# Patient Record
Sex: MALE | Race: WHITE | NOT HISPANIC OR LATINO | Employment: FULL TIME | URBAN - METROPOLITAN AREA
[De-identification: names, ages, dates, MRNs, and addresses within clinical notes are randomized per-mention and may not be internally consistent; named-entity substitution may affect disease eponyms.]

---

## 2017-03-01 ENCOUNTER — HOSPITAL ENCOUNTER (OUTPATIENT)
Dept: RADIOLOGY | Facility: CLINIC | Age: 32
Discharge: HOME | End: 2017-03-01
Payer: COMMERCIAL

## 2017-03-01 ENCOUNTER — ALLSCRIPTS OFFICE VISIT (OUTPATIENT)
Dept: OTHER | Facility: OTHER | Age: 32
End: 2017-03-01

## 2017-03-01 DIAGNOSIS — M25.521 PAIN IN RIGHT ELBOW: ICD-10-CM

## 2017-03-01 DIAGNOSIS — T15.90XA FOREIGN BODY OF EXTERNAL EYE: ICD-10-CM

## 2017-03-01 PROCEDURE — 73080 X-RAY EXAM OF ELBOW: CPT

## 2017-03-02 ENCOUNTER — TRANSCRIBE ORDERS (OUTPATIENT)
Dept: ADMINISTRATIVE | Facility: HOSPITAL | Age: 32
End: 2017-03-02

## 2017-03-02 ENCOUNTER — HOSPITAL ENCOUNTER (OUTPATIENT)
Dept: RADIOLOGY | Facility: HOSPITAL | Age: 32
Discharge: HOME | End: 2017-03-02
Attending: ORTHOPAEDIC SURGERY
Payer: COMMERCIAL

## 2017-03-02 DIAGNOSIS — T15.90XA FOREIGN BODY OF EXTERNAL EYE: ICD-10-CM

## 2017-03-02 PROCEDURE — 70030 X-RAY EYE FOR FOREIGN BODY: CPT

## 2017-03-07 ENCOUNTER — HOSPITAL ENCOUNTER (OUTPATIENT)
Dept: RADIOLOGY | Facility: HOSPITAL | Age: 32
Discharge: HOME | End: 2017-03-07
Attending: ORTHOPAEDIC SURGERY
Payer: COMMERCIAL

## 2017-03-07 DIAGNOSIS — M25.521 PAIN IN RIGHT ELBOW: ICD-10-CM

## 2017-03-07 PROCEDURE — 73221 MRI JOINT UPR EXTREM W/O DYE: CPT

## 2017-03-10 ENCOUNTER — ALLSCRIPTS OFFICE VISIT (OUTPATIENT)
Dept: OTHER | Facility: OTHER | Age: 32
End: 2017-03-10

## 2017-04-26 RX ORDER — TRAMADOL HYDROCHLORIDE 50 MG/1
50 TABLET ORAL EVERY 6 HOURS PRN
COMMUNITY
End: 2019-12-06

## 2017-04-30 ENCOUNTER — ANESTHESIA EVENT (OUTPATIENT)
Dept: PERIOP | Facility: AMBULARY SURGERY CENTER | Age: 32
End: 2017-04-30
Payer: COMMERCIAL

## 2017-05-01 ENCOUNTER — ANESTHESIA (OUTPATIENT)
Dept: PERIOP | Facility: AMBULARY SURGERY CENTER | Age: 32
End: 2017-05-01
Payer: COMMERCIAL

## 2017-05-01 ENCOUNTER — HOSPITAL ENCOUNTER (OUTPATIENT)
Facility: AMBULARY SURGERY CENTER | Age: 32
Setting detail: OUTPATIENT SURGERY
Discharge: HOME | End: 2017-05-01
Attending: ORTHOPAEDIC SURGERY | Admitting: ORTHOPAEDIC SURGERY
Payer: COMMERCIAL

## 2017-05-01 VITALS
SYSTOLIC BLOOD PRESSURE: 117 MMHG | HEART RATE: 88 BPM | TEMPERATURE: 97 F | HEIGHT: 68 IN | WEIGHT: 245 LBS | RESPIRATION RATE: 20 BRPM | DIASTOLIC BLOOD PRESSURE: 71 MMHG | BODY MASS INDEX: 37.13 KG/M2 | OXYGEN SATURATION: 95 %

## 2017-05-01 PROCEDURE — C1713 ANCHOR/SCREW BN/BN,TIS/BN: HCPCS | Performed by: ORTHOPAEDIC SURGERY

## 2017-05-01 DEVICE — ANCHOR SUT MINI 2.4 X 8.5MM DISP: Type: IMPLANTABLE DEVICE | Site: ARM | Status: FUNCTIONAL

## 2017-05-01 RX ORDER — SODIUM CHLORIDE, SODIUM LACTATE, POTASSIUM CHLORIDE, CALCIUM CHLORIDE 600; 310; 30; 20 MG/100ML; MG/100ML; MG/100ML; MG/100ML
125 INJECTION, SOLUTION INTRAVENOUS CONTINUOUS
Status: DISCONTINUED | OUTPATIENT
Start: 2017-05-01 | End: 2017-05-01 | Stop reason: HOSPADM

## 2017-05-01 RX ORDER — FENTANYL CITRATE/PF 50 MCG/ML
50 SYRINGE (ML) INJECTION
Status: DISCONTINUED | OUTPATIENT
Start: 2017-05-01 | End: 2017-05-01 | Stop reason: HOSPADM

## 2017-05-01 RX ORDER — KETOROLAC TROMETHAMINE 30 MG/ML
INJECTION, SOLUTION INTRAMUSCULAR; INTRAVENOUS AS NEEDED
Status: DISCONTINUED | OUTPATIENT
Start: 2017-05-01 | End: 2017-05-01 | Stop reason: SURG

## 2017-05-01 RX ORDER — FENTANYL CITRATE 50 UG/ML
INJECTION, SOLUTION INTRAMUSCULAR; INTRAVENOUS AS NEEDED
Status: DISCONTINUED | OUTPATIENT
Start: 2017-05-01 | End: 2017-05-01 | Stop reason: SURG

## 2017-05-01 RX ORDER — ONDANSETRON 2 MG/ML
INJECTION INTRAMUSCULAR; INTRAVENOUS AS NEEDED
Status: DISCONTINUED | OUTPATIENT
Start: 2017-05-01 | End: 2017-05-01 | Stop reason: SURG

## 2017-05-01 RX ORDER — MIDAZOLAM HYDROCHLORIDE 1 MG/ML
INJECTION INTRAMUSCULAR; INTRAVENOUS AS NEEDED
Status: DISCONTINUED | OUTPATIENT
Start: 2017-05-01 | End: 2017-05-01 | Stop reason: SURG

## 2017-05-01 RX ORDER — PROPOFOL 10 MG/ML
INJECTION, EMULSION INTRAVENOUS AS NEEDED
Status: DISCONTINUED | OUTPATIENT
Start: 2017-05-01 | End: 2017-05-01 | Stop reason: SURG

## 2017-05-01 RX ORDER — BUPIVACAINE HYDROCHLORIDE AND EPINEPHRINE 5; 5 MG/ML; UG/ML
INJECTION, SOLUTION EPIDURAL; INTRACAUDAL; PERINEURAL AS NEEDED
Status: DISCONTINUED | OUTPATIENT
Start: 2017-05-01 | End: 2017-05-01 | Stop reason: HOSPADM

## 2017-05-01 RX ADMIN — DEXAMETHASONE SODIUM PHOSPHATE 8 MG: 10 INJECTION INTRAMUSCULAR; INTRAVENOUS at 13:00

## 2017-05-01 RX ADMIN — FENTANYL CITRATE 50 MCG: 50 INJECTION, SOLUTION INTRAMUSCULAR; INTRAVENOUS at 13:01

## 2017-05-01 RX ADMIN — ONDANSETRON 4 MG: 2 INJECTION INTRAMUSCULAR; INTRAVENOUS at 13:29

## 2017-05-01 RX ADMIN — MIDAZOLAM HYDROCHLORIDE 2 MG: 1 INJECTION, SOLUTION INTRAMUSCULAR; INTRAVENOUS at 12:54

## 2017-05-01 RX ADMIN — SODIUM CHLORIDE, SODIUM LACTATE, POTASSIUM CHLORIDE, AND CALCIUM CHLORIDE 125 ML/HR: .6; .31; .03; .02 INJECTION, SOLUTION INTRAVENOUS at 11:52

## 2017-05-01 RX ADMIN — FENTANYL CITRATE 50 MCG: 50 INJECTION, SOLUTION INTRAMUSCULAR; INTRAVENOUS at 13:30

## 2017-05-01 RX ADMIN — CEFAZOLIN SODIUM 2000 MG: 1 SOLUTION INTRAVENOUS at 13:02

## 2017-05-01 RX ADMIN — PROPOFOL 200 MG: 10 INJECTION, EMULSION INTRAVENOUS at 12:55

## 2017-05-01 RX ADMIN — KETOROLAC TROMETHAMINE 30 MG: 30 INJECTION, SOLUTION INTRAMUSCULAR at 13:29

## 2017-05-10 ENCOUNTER — ALLSCRIPTS OFFICE VISIT (OUTPATIENT)
Dept: OTHER | Facility: OTHER | Age: 32
End: 2017-05-10

## 2017-05-17 ENCOUNTER — APPOINTMENT (OUTPATIENT)
Dept: OCCUPATIONAL THERAPY | Facility: CLINIC | Age: 32
End: 2017-05-17
Payer: COMMERCIAL

## 2017-05-17 PROCEDURE — 97165 OT EVAL LOW COMPLEX 30 MIN: CPT

## 2017-05-22 ENCOUNTER — APPOINTMENT (OUTPATIENT)
Dept: OCCUPATIONAL THERAPY | Facility: CLINIC | Age: 32
End: 2017-05-22
Payer: COMMERCIAL

## 2017-05-22 PROCEDURE — 97140 MANUAL THERAPY 1/> REGIONS: CPT

## 2017-05-22 PROCEDURE — 97110 THERAPEUTIC EXERCISES: CPT

## 2017-05-23 ENCOUNTER — APPOINTMENT (OUTPATIENT)
Dept: OCCUPATIONAL THERAPY | Facility: CLINIC | Age: 32
End: 2017-05-23
Payer: COMMERCIAL

## 2017-05-24 ENCOUNTER — APPOINTMENT (OUTPATIENT)
Dept: OCCUPATIONAL THERAPY | Facility: CLINIC | Age: 32
End: 2017-05-24
Payer: COMMERCIAL

## 2017-05-24 PROCEDURE — 97110 THERAPEUTIC EXERCISES: CPT

## 2017-05-24 PROCEDURE — 97140 MANUAL THERAPY 1/> REGIONS: CPT

## 2017-05-31 ENCOUNTER — APPOINTMENT (OUTPATIENT)
Dept: OCCUPATIONAL THERAPY | Facility: CLINIC | Age: 32
End: 2017-05-31
Payer: COMMERCIAL

## 2017-05-31 PROCEDURE — 97140 MANUAL THERAPY 1/> REGIONS: CPT

## 2017-05-31 PROCEDURE — 97110 THERAPEUTIC EXERCISES: CPT

## 2017-06-05 ENCOUNTER — APPOINTMENT (OUTPATIENT)
Dept: OCCUPATIONAL THERAPY | Facility: CLINIC | Age: 32
End: 2017-06-05
Payer: COMMERCIAL

## 2017-06-05 PROCEDURE — 97110 THERAPEUTIC EXERCISES: CPT

## 2017-06-05 PROCEDURE — 97140 MANUAL THERAPY 1/> REGIONS: CPT

## 2017-06-07 ENCOUNTER — APPOINTMENT (OUTPATIENT)
Dept: OCCUPATIONAL THERAPY | Facility: CLINIC | Age: 32
End: 2017-06-07
Payer: COMMERCIAL

## 2017-06-07 PROCEDURE — 97140 MANUAL THERAPY 1/> REGIONS: CPT

## 2017-06-07 PROCEDURE — 97110 THERAPEUTIC EXERCISES: CPT

## 2017-06-12 ENCOUNTER — APPOINTMENT (OUTPATIENT)
Dept: OCCUPATIONAL THERAPY | Facility: CLINIC | Age: 32
End: 2017-06-12
Payer: COMMERCIAL

## 2017-06-12 PROCEDURE — 97110 THERAPEUTIC EXERCISES: CPT

## 2017-06-12 PROCEDURE — 97140 MANUAL THERAPY 1/> REGIONS: CPT

## 2017-06-14 ENCOUNTER — APPOINTMENT (OUTPATIENT)
Dept: OCCUPATIONAL THERAPY | Facility: CLINIC | Age: 32
End: 2017-06-14
Payer: COMMERCIAL

## 2017-06-14 PROCEDURE — 97110 THERAPEUTIC EXERCISES: CPT

## 2017-06-14 PROCEDURE — 97140 MANUAL THERAPY 1/> REGIONS: CPT

## 2017-06-19 ENCOUNTER — APPOINTMENT (OUTPATIENT)
Dept: OCCUPATIONAL THERAPY | Facility: CLINIC | Age: 32
End: 2017-06-19
Payer: COMMERCIAL

## 2017-06-21 ENCOUNTER — APPOINTMENT (OUTPATIENT)
Dept: OCCUPATIONAL THERAPY | Facility: CLINIC | Age: 32
End: 2017-06-21
Payer: COMMERCIAL

## 2017-06-21 ENCOUNTER — ALLSCRIPTS OFFICE VISIT (OUTPATIENT)
Dept: OTHER | Facility: OTHER | Age: 32
End: 2017-06-21

## 2017-06-21 PROCEDURE — 97140 MANUAL THERAPY 1/> REGIONS: CPT

## 2017-06-21 PROCEDURE — 97110 THERAPEUTIC EXERCISES: CPT

## 2017-06-26 ENCOUNTER — APPOINTMENT (OUTPATIENT)
Dept: OCCUPATIONAL THERAPY | Facility: CLINIC | Age: 32
End: 2017-06-26
Payer: COMMERCIAL

## 2017-06-26 PROCEDURE — 97140 MANUAL THERAPY 1/> REGIONS: CPT

## 2017-06-26 PROCEDURE — 97110 THERAPEUTIC EXERCISES: CPT

## 2017-06-28 ENCOUNTER — APPOINTMENT (OUTPATIENT)
Dept: OCCUPATIONAL THERAPY | Facility: CLINIC | Age: 32
End: 2017-06-28
Payer: COMMERCIAL

## 2017-06-28 PROCEDURE — 97110 THERAPEUTIC EXERCISES: CPT

## 2017-06-28 PROCEDURE — 97140 MANUAL THERAPY 1/> REGIONS: CPT

## 2017-07-05 ENCOUNTER — APPOINTMENT (OUTPATIENT)
Dept: OCCUPATIONAL THERAPY | Facility: CLINIC | Age: 32
End: 2017-07-05
Payer: COMMERCIAL

## 2017-07-05 PROCEDURE — 97140 MANUAL THERAPY 1/> REGIONS: CPT

## 2017-07-05 PROCEDURE — 97110 THERAPEUTIC EXERCISES: CPT

## 2017-07-10 ENCOUNTER — APPOINTMENT (OUTPATIENT)
Dept: OCCUPATIONAL THERAPY | Facility: CLINIC | Age: 32
End: 2017-07-10
Payer: COMMERCIAL

## 2017-07-10 PROCEDURE — 97110 THERAPEUTIC EXERCISES: CPT

## 2017-07-10 PROCEDURE — 97140 MANUAL THERAPY 1/> REGIONS: CPT

## 2017-07-12 ENCOUNTER — APPOINTMENT (OUTPATIENT)
Dept: OCCUPATIONAL THERAPY | Facility: CLINIC | Age: 32
End: 2017-07-12
Payer: COMMERCIAL

## 2017-07-12 PROCEDURE — 97110 THERAPEUTIC EXERCISES: CPT

## 2017-07-12 PROCEDURE — 97140 MANUAL THERAPY 1/> REGIONS: CPT

## 2017-07-17 ENCOUNTER — APPOINTMENT (OUTPATIENT)
Dept: OCCUPATIONAL THERAPY | Facility: CLINIC | Age: 32
End: 2017-07-17
Payer: COMMERCIAL

## 2017-07-17 PROCEDURE — 97110 THERAPEUTIC EXERCISES: CPT

## 2017-07-24 ENCOUNTER — APPOINTMENT (OUTPATIENT)
Dept: OCCUPATIONAL THERAPY | Facility: CLINIC | Age: 32
End: 2017-07-24
Payer: COMMERCIAL

## 2017-07-26 ENCOUNTER — APPOINTMENT (OUTPATIENT)
Dept: OCCUPATIONAL THERAPY | Facility: CLINIC | Age: 32
End: 2017-07-26
Payer: COMMERCIAL

## 2017-08-02 ENCOUNTER — ALLSCRIPTS OFFICE VISIT (OUTPATIENT)
Dept: OTHER | Facility: OTHER | Age: 32
End: 2017-08-02

## 2018-01-12 NOTE — PROGRESS NOTES
Assessment    1  Traumatic rupture of tendon of elbow or forearm (841 9) (S52 583U)    Plan  Nery Stone does have a partial Tear at the common extensor tendon in his right elbow  Conservative treatment has failed and his elbow remains quite painful  We did discuss that there is a surgical option for this to repair the tendon  The pain will cause him to miss work occasionally  Nery Stone is frustrated with his painful right elbow and would like to have surgery to repair this  We had a long discussion including the risks of the surgery which are but not inclusive to infection or failure of the procedure  We also discussed there is a 3 month recovery time before he can return to full duty at work as a   We also discussed that this surgery is quite painful and he is intolerable to light touch on his elbow today  I do have some concern over his tolerance to pain and he needs to carefully consider this before proceeding with surgery  He would like to take some time and 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  He will meet with her  today and discuss dates 2-3 months in advance  Discussion/Summary  The patient was counseled regarding diagnostic results, instructions for management, risk factor reductions, prognosis, patient and family education, impressions, risks and benefits of treatment options, importance of compliance with treatment  Chief Complaint    1  Elbow Pain    History of Present Illness  Nery Stone is a 77-year-old male here today on a follow-up visit for right elbow pain  On last visit I prescribed an MRI questioned extensor tendon tear as he has been suffering from lateral epicondylitis for over a year and conservative measures of treatment have failed him  He continues to have the persistent ache about the right lateral elbow that will radiate distally  Extension of his elbow will dramatically increases pain   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  He will find relief if he flexes the elbow and rest on his midsection  Review of Systems    Constitutional: No fever or chills, feels well, no tiredness, no recent weight loss or weight gain  Eyes: No complaints of red eyes, no eyesight problems  ENT: no complaints of loss of hearing, no nosebleeds, no sore throat  Cardiovascular: No complaints of chest pain, no palpitations, no leg claudication or lower extremity edema  Respiratory: No complaints of shortness of breath, no wheezing, no cough  Gastrointestinal: No complaints of abdominal pain, no constipation, no nausea or vomiting, no diarrhea or bloody stools  Genitourinary: No complaints of dysuria or incontinence, no hesitancy, no nocturia  Musculoskeletal: as noted in HPI  Integumentary: No complaints of skin rash or lesion, no itching or dry skin, no skin wounds  Neurological: No complaints of headache, no confusion, no numbness or tingling, no dizziness  Psychiatric: No suicidal thoughts, no anxiety, no depression  Endocrine: No muscle weakness, no frequent urination, no excessive thirst, no feelings of weakness  ROS reviewed  Active Problems    1  Foreign body in eye, unspecified laterality, initial encounter ([id number]) (T15 [id number])   2  Lateral epicondylitis of right elbow (726 32) (M77 11)   3  Right elbow pain (719 42) (M25 521)    Past Medical History    The active problems and past medical history were reviewed and updated today  Surgical History    · History of Nose Surgery    The surgical history was reviewed and updated today  Family History    · Family history of arthritis (V17 7) (Z82 61)   · Family history of malignant neoplasm (V16 9) (Z80 9)    The family history was reviewed and updated today  Social History    · Never a smoker   · Occasional alcohol use  The social history was reviewed and updated today  The social history was reviewed and is unchanged  Current Meds   1  TraMADol HCl - 50 MG Oral Tablet; Therapy: (Recorded:01Mar2017) to Recorded    The medication list was reviewed and updated today  Allergies    1  No Known Drug Allergies    Physical Exam    Right Elbow: Appearance: Normal except swelling, but no deformity, no ecchymosis, no effusion and no erythema  Tenderness: None except the lateral epicondyle, but not the medial epicondyle and not the olecranon bursa  Palpatory Findings: no warmth  Extension: painful  Pronation: 5/5  Supination: 3/5 and painful  Motor: Normal except as noted:   Special Tests: negative valgus stress and negative varus stress  Cardiovascular - Heart - RRR, no murmurs, no rubs, no gallops  Respiratory - Lungs - Clear to auscultation bilaterally, no rales, no rhonci, no wheezes  Results/Data  I personally reviewed the films/images/results in the office today  My interpretation follows  MRI Review MRI of the right elbow demonstrates a partial intrasubstance tear at the origin of the common extensor tendon  Attending Note  Collaborating Physician Note: Collaborating Physician: I interviewed and examined the patient, I discussed the case with the Advanced Practitioner and reviewed the note, I supervised the Advanced Practitioner and I agree with the Advanced Practitioner note  Agree with Advanced Practitioner Note Except: Nery Stone continues to have chronic pain about the right elbow and wishes to have a right elbow extensor tendon repair at the lateral epicondyle  He understands the risks and benefits of procedure and wishes to go ahead  He has pain to palpation over the lateral epicondyle as well as decreased strength in testing of the wrist extensors  The risks are inclusive of but not limited to infection, stiffness, nerve injury causing numbness pain and weakness, persistence of symptoms, recurrence of tear, failure to regain full strength and ability, worsening of symptoms, and need for further surgery  Future Appointments    Date/Time Provider Specialty Site   05/01/2017 01:00 PM ROBB Higgins  Orthopedic Surgery [facility] OUTPAT[facility]   05/10/2017 10:10 AM ROBB Higgins   Orthopedic Surgery [facility]     Signatures   Electronically signed by : TI Purvis; Mar 10 2017  9:14AM EST                       (Author)    Electronically signed by : ROBB Will ; Mar 10 2017  5:45PM EST                       (Author)

## 2018-01-13 VITALS
DIASTOLIC BLOOD PRESSURE: 85 MMHG | HEART RATE: 94 BPM | WEIGHT: 244 LBS | BODY MASS INDEX: 36.98 KG/M2 | SYSTOLIC BLOOD PRESSURE: 121 MMHG | HEIGHT: 68 IN

## 2018-01-13 VITALS
DIASTOLIC BLOOD PRESSURE: 83 MMHG | SYSTOLIC BLOOD PRESSURE: 129 MMHG | HEART RATE: 101 BPM | BODY MASS INDEX: 38.66 KG/M2 | HEIGHT: 68 IN | WEIGHT: 255.13 LBS

## 2019-12-04 NOTE — TELEPHONE ENCOUNTER
Shereen Iyer  called in    # [phone]  Fax# [phone]    She is requesting an appointment for the patient for   Right Groin Pain  [place] w[place]   email sent to [facility]

## 2019-12-06 VITALS
WEIGHT: 261.6 LBS | HEIGHT: 68 IN | BODY MASS INDEX: 39.65 KG/M2 | DIASTOLIC BLOOD PRESSURE: 88 MMHG | HEART RATE: 91 BPM | SYSTOLIC BLOOD PRESSURE: 131 MMHG

## 2019-12-06 DIAGNOSIS — S73.101A HIP SPRAIN, RIGHT, INITIAL ENCOUNTER: Primary | ICD-10-CM

## 2019-12-06 PROCEDURE — 99214 OFFICE O/P EST MOD 30 MIN: CPT | Performed by: ORTHOPAEDIC SURGERY

## 2019-12-06 NOTE — PROGRESS NOTES
Assessment/Plan:  1  Hip sprain, right, initial encounter  MRI hip right wo contrast       Scribe Attestation    I,:   Ofelia Moyer am acting as a scribe while in the presence of the attending physician :        I,:   Marily Godwin MD personally performed the services described in this documentation    as scribed in my presence :            CT scan of his pelvis does demonstrate what appears to be an inguinal hernia, however I do not appreciate any musculoskeletal injury  Upon physical examination, he does demonstrate signs and symptoms consistent with a potential right hip flexor strain versus tear  He does demonstrate decreased strength with flexion as well as pain with palpation over his inguinal region  At this time, I do believe an MRI of his right hip is warranted  I provided him with a prescription for this today in the office  He states he has remained out of work since his injury and I provided a work note stating he is still to remain out of work until further notice  We will see him after he receives his MRI to review the results and discuss further treatment options  Subjective:   Lamar Edmonds is a 37 y o  male who presents to the office today for initial evaluation of right groin pain  This is a worker's compensation case  He states on October 21st he was at work and 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 his leg slipped out to the side and he felt a sharp pain into his groin  He does come with a CT scan that demonstrates a right inguinal hernia  He saw a general surgeon who believes this is not the source of his pain and referred him to us  At today's visit, he localizes the majority of his pain over the anterior aspect of his hip  He describes the pain as sharp, constant and moderate in intensity  He presents with an antalgic gait  He does note an occasional numbness down his leg  He denies any radicular symptoms        Review of Systems   Constitutional: Negative for chills, fever and unexpected weight change  HENT: Negative for hearing loss, nosebleeds and sore throat  Eyes: Negative for pain, redness and visual disturbance  Respiratory: Negative for cough, shortness of breath and wheezing  Cardiovascular: Negative for chest pain, palpitations and leg swelling  Gastrointestinal: Negative for abdominal pain, nausea and vomiting  Endocrine: Negative for polyphagia and polyuria  Genitourinary: Negative for dysuria and hematuria  Musculoskeletal: Positive for myalgias  See HPI   Skin: Negative for rash and wound  Neurological: Positive for numbness  Negative for dizziness and headaches  Psychiatric/Behavioral: Negative for decreased concentration and suicidal ideas  The patient is not nervous/anxious  Past Medical History:   Diagnosis Date    Sleep apnea     Hx 2014 post deviated septum repair       Past Surgical History:   Procedure Laterality Date    EYE SURGERY Left     metal in eye    KNEE BIOPSY      NOSE SURGERY      rhinoplasty, septoplasty    TENDON RELEASE Right 2017    Procedure: RIGHT ELBOW EXTENSOR TENDON REPAIR;  Surgeon: Yolanda Delgado MD;  Location: [facility] MAIN OR;  Service:        Family History   Problem Relation Age of Onset    Hypertension Mother     No Known Problems Father     Asthma Sister     Hypertension Maternal Grandfather        Social History     Occupational History    Not on file   Tobacco Use    Smoking status: Former Smoker     Packs/day: 0 75     Years: 15 00     Pack years: 11 25     Last attempt to quit:      Years since quittin 9    Smokeless tobacco: Never Used   Substance and Sexual Activity    Alcohol use: Yes     Comment: socially    Drug use: No    Sexual activity: Not on file       No current outpatient medications on file      No Known Allergies    Objective:  Vitals:    19 1019   BP: 131/88   Pulse: 91       Right Hip Exam     Tenderness   Right hip tenderness location: inguinal region  Range of Motion   Flexion: 70   External rotation: 30 (sore)   Internal rotation: 5 (pain)     Muscle Strength   Flexion: 3/5     Other   Erythema: absent  Sensation: normal  Pulse: present    Comments:    Log Roll (+)  SLR (+)            Physical Exam   Constitutional: He is oriented to person, place, and time  He appears well-developed and well-nourished  HENT:   Head: Normocephalic and atraumatic  Eyes: Pupils are equal, round, and reactive to light  Conjunctivae are normal    Neck: Normal range of motion  Neck supple  Cardiovascular: Normal rate and intact distal pulses  Pulmonary/Chest: Effort normal  No respiratory distress  Musculoskeletal:   As noted in HPI   Neurological: He is alert and oriented to person, place, and time  Skin: Skin is warm and dry  Psychiatric: He has a normal mood and affect  His behavior is normal    Nursing note and vitals reviewed  I have personally reviewed pertinent films in PACS and my interpretation is as follows:  CT scan of the pelvis without contrast obtained on 11/05/2019 demonstrates what appears to be an inguinal hernia but no obvious musculoskeletal injury

## 2019-12-06 NOTE — TELEPHONE ENCOUNTER
WC  called to have MRI order, Work note, and office notes faxed to [phone]  Office note and work not not complete yet

## 2019-12-06 NOTE — LETTER
December 6, 2019     Patient: Modesto Russell   YOB: 1985   Date of Visit: 12/6/2019       To Whom it May Concern:    Modesto Russell is under my professional care  He was seen in my office on 12/6/2019  He is to remain out of work until further notice  If you have any questions or concerns, please don't hesitate to call           Sincerely,          Catalino Valles MD        CC: No Recipients

## 2019-12-11 DIAGNOSIS — S73.101A HIP SPRAIN, RIGHT, INITIAL ENCOUNTER: Primary | ICD-10-CM

## 2019-12-11 NOTE — TELEPHONE ENCOUNTER
Faxed form  Called and lmom advising order was placed and patient will need xray done first  Please verify patient received message

## 2019-12-16 DIAGNOSIS — S73.101A HIP SPRAIN, RIGHT, INITIAL ENCOUNTER: Primary | ICD-10-CM

## 2019-12-16 NOTE — TELEPHONE ENCOUNTER
----- Message from Leslie Posada PA-C sent at 12/16/2019 11:08 AM EST -----   Normal MRI   PT advised  ----- Message -----  From: Interface, Transcription Incoming  Sent: 12/16/2019  10:31 AM EST  To: Asiya Mcgrath MD

## 2019-12-20 VITALS
WEIGHT: 263 LBS | SYSTOLIC BLOOD PRESSURE: 128 MMHG | HEART RATE: 84 BPM | BODY MASS INDEX: 39.86 KG/M2 | HEIGHT: 68 IN | DIASTOLIC BLOOD PRESSURE: 83 MMHG

## 2019-12-20 DIAGNOSIS — R20.0 NUMBNESS AND TINGLING OF RIGHT LOWER EXTREMITY: ICD-10-CM

## 2019-12-20 DIAGNOSIS — S76.011A STRAIN OF HIP FLEXOR, RIGHT, INITIAL ENCOUNTER: Primary | ICD-10-CM

## 2019-12-20 DIAGNOSIS — R20.2 NUMBNESS AND TINGLING OF RIGHT LOWER EXTREMITY: ICD-10-CM

## 2019-12-20 PROCEDURE — 99214 OFFICE O/P EST MOD 30 MIN: CPT | Performed by: ORTHOPAEDIC SURGERY

## 2019-12-20 NOTE — LETTER
December 20, 2019     Patient: Suzy Covarrubias   YOB: 1985   Date of Visit: 12/20/2019       To Whom it May Concern:    Suzy Covarrubias is under my professional care  He was seen in my office on 12/20/2019  He should not return to work until cleared by a physician  I will re-evaluate him in 4 weeks time  If you have any questions or concerns, please don't hesitate to call           Sincerely,          Shaneka Amato MD        CC: No Recipients

## 2019-12-20 NOTE — PROGRESS NOTES
Assessment/Plan:  1  Strain of hip flexor, right, initial encounter     2  Numbness and tingling of right lower extremity  Ambulatory referral to Pain Management       Scribe Attestation    I,:   Bekah Goldman MA am acting as a scribe while in the presence of the attending physician :        I,:   Everton Richardson MD personally performed the services described in this documentation    as scribed in my presence :            Ania Sher and I engaged in a discussion today in regards to his right hip pain  Patient I reviewed the MRI today in the office which does not demonstrate any tear or other significant injury  I do believe he is suffering from a hip flexor strain  This would be best treated with a course of physical therapy  I would like to see him back in 4 weeks time after he has done physical therapy  At this time we will continue with his work restrictions of no work until further notice  In regards to his numbness and tingling, I believe this is irritation of a nerve from his hip injury  However, to rule out back injury or issue I would like him to follow up with Dr Nunn Neither  The referral for Dr Nunn Neither was placed in the chart today  I will see Chloé Hou back in 4 weeks time  Subjective:   Cloretta Hashimoto is a 37 y o  male who presents to the office today for follow-up evaluation of his right hip  This is a continuation of a worker's compensation case  Patient was able to obtain MRI prior to today's visit  He was advised on the phone that the MRI was normal however due to his persistent pain he presents today in the office  Patient states he has not begun physical therapy  He continues to have pain to the groin area  He also notes some intermittent numbness to the lateral right hip  At this point time he has not returned to work  He denies any new injuries today  Review of Systems   Constitutional: Negative for chills, fever and unexpected weight change     HENT: Negative for hearing loss, nosebleeds and sore throat  Eyes: Negative for pain, redness and visual disturbance  Respiratory: Negative for cough, shortness of breath and wheezing  Cardiovascular: Negative for chest pain, palpitations and leg swelling  Gastrointestinal: Negative for abdominal pain, nausea and vomiting  Endocrine: Negative for polydipsia and polyuria  Genitourinary: Negative for dysuria and hematuria  Musculoskeletal: Positive for arthralgias and myalgias  Negative for joint swelling  Skin: Negative for rash and wound  Neurological: Positive for numbness  Negative for dizziness and headaches  Psychiatric/Behavioral: Negative for agitation, decreased concentration and suicidal ideas  Past Medical History:   Diagnosis Date    Sleep apnea     Hx  post deviated septum repair       Past Surgical History:   Procedure Laterality Date    EYE SURGERY Left     metal in eye    KNEE BIOPSY      NOSE SURGERY      rhinoplasty, septoplasty    TENDON RELEASE Right 2017    Procedure: RIGHT ELBOW EXTENSOR TENDON REPAIR;  Surgeon: Everton Richardson MD;  Location: [facility] MAIN OR;  Service:        Family History   Problem Relation Age of Onset    Hypertension Mother     No Known Problems Father     Asthma Sister     Hypertension Maternal Grandfather        Social History     Occupational History    Not on file   Tobacco Use    Smoking status: Former Smoker     Packs/day: 0 75     Years: 15 00     Pack years: 11 25     Last attempt to quit:      Years since quittin 9    Smokeless tobacco: Never Used   Substance and Sexual Activity    Alcohol use: Yes     Comment: socially    Drug use: No    Sexual activity: Not on file       No current outpatient medications on file  No Known Allergies    Objective:  Vitals:    19 0942   BP: 128/83   Pulse: 84       Right Hip Exam     Tenderness   Right hip tenderness location: Inguinal area      Range of Motion   Flexion: 80   External rotation: 30   Internal rotation: 5     Other   Erythema: absent  Scars: absent  Sensation: normal  Pulse: present    Comments:  Soreness and stiffness noted with range of motion  Hip flexion 4-/5            Physical Exam   Constitutional: He is oriented to person, place, and time  He appears well-developed  HENT:   Head: Normocephalic and atraumatic  Eyes: Conjunctivae are normal    Neck: Neck supple  Cardiovascular: Intact distal pulses  Pulmonary/Chest: Effort normal    Neurological: He is alert and oriented to person, place, and time  Skin: Skin is warm and dry  Psychiatric: He has a normal mood and affect  His behavior is normal        I have personally reviewed pertinent films in PACS and my interpretation is as follows:  MRI of the right hip from outside provider obtained on 12/13/2019 demonstrates no osseous abnormality, no evidence of tear to musculature, and no degeneration of the labrum

## 2019-12-20 NOTE — TELEPHONE ENCOUNTER
Patient sees Dr Chuy GALEAS  from [place] called for Work Status Note, Referral to Pain Management, and Office notes from today to be faxed to [phone]  Office notes still not available, will fax when completed

## 2019-12-30 ENCOUNTER — CONSULT (OUTPATIENT)
Dept: PAIN MEDICINE | Facility: CLINIC | Age: 34
End: 2019-12-30
Payer: OTHER MISCELLANEOUS

## 2019-12-30 VITALS
HEIGHT: 68 IN | SYSTOLIC BLOOD PRESSURE: 130 MMHG | BODY MASS INDEX: 39.37 KG/M2 | DIASTOLIC BLOOD PRESSURE: 87 MMHG | WEIGHT: 259.8 LBS | HEART RATE: 86 BPM

## 2019-12-30 DIAGNOSIS — M54.41 ACUTE RIGHT-SIDED LOW BACK PAIN WITH RIGHT-SIDED SCIATICA: Primary | ICD-10-CM

## 2019-12-30 DIAGNOSIS — R20.2 NUMBNESS AND TINGLING OF RIGHT LOWER EXTREMITY: ICD-10-CM

## 2019-12-30 DIAGNOSIS — M54.16 LUMBAR RADICULOPATHY: ICD-10-CM

## 2019-12-30 DIAGNOSIS — R20.0 NUMBNESS AND TINGLING OF RIGHT LOWER EXTREMITY: ICD-10-CM

## 2019-12-30 PROCEDURE — 99244 OFF/OP CNSLTJ NEW/EST MOD 40: CPT | Performed by: ANESTHESIOLOGY

## 2019-12-30 NOTE — PROGRESS NOTES
Assessment:  1  Acute right-sided low back pain with right-sided sciatica    2  Lumbar radiculopathy    3  Numbness and tingling of right lower extremity        Plan:  Orders Placed This Encounter   Procedures    MRI lumbar spine without contrast     Standing Status:   Future     Standing Expiration Date:   12/30/2023     Scheduling Instructions: There is no preparation for this test  Please leave your jewelry and valuables at home, wedding rings are the exception  Please bring your insurance cards, a form of photo ID and a list of your medications with you  Arrive 15 minutes prior to your appointment time in order to register  Please bring any prior CT or MRI studies of this area that were not performed at a [facility]  To schedule this appointment, please contact Central Scheduling at [phone]  Order Specific Question:   What is the patient's sedation requirement? Answer:   Unknown     My impressions and treatment recommendations were discussed in detail with the patient, who verbalized understanding and had no further questions  The patient reports that his primary pain complaint as a result of his injury is right groin pain  He was originally sent to Dr Franchesca Gauthier to evaluate his right hip  According to the MRI of the right hip, there is no right hip pathology  He does report that he was diagnosed with bilateral hernias in his groins  I suggested that the patient return to the physician that diagnosed these hernias to see if they can be corrected  The patient verbalized understanding  In addition, he is complaining of lumbar radicular symptoms in the right lower extremity in the right L5 distribution  He also is complaining of right-sided low back pain  As such, I felt it reasonable to have the patient undergo a MRI of the lumbar spine to evaluate for any pathology that may be contributing to his pain complaints      Follow-up is planned in 4 weeks time or sooner as warranted  Discharge instructions were provided  I personally saw and examined the patient and I agree with the above discussed plan of care  History of Present Illness:    Minesh Pedroza is a 37 y o  male who presents to [facility] for initial evaluation of the above stated pain complaints  The patient has a past medical and chronic pain history as outlined in the assessment section  He was referred by Dr Annelise Doran  The patient reports a 2 month history of right groin pain as result of a work related injury  He is also complaining of numbness and tingling in the right lower extremity what appears to be the right L5 distribution  He also complains of mild right-sided low back pain as well  He states that he was involved in an injury at work on October 21, 2019 at which time his symptoms began  He describes his pain as moderate to severe and 6/10 on the verbal numerical pain rating scale  His pain is nearly constant in nature without any typical pattern  He describes pain as "numbness, sharp, throbbing, and dull/aching    He reports weakness in his lower extremities  He does not ambulate with any assistive devices  The patient reports that lying down and relaxation decreases pain  Standing, bending, sitting, walking, exercise, coughing, and sneezing increases pain  He reports that heat/ice treatment provides moderate relief  He is not currently using any pain medications  Review of Systems:    Review of Systems   Constitutional: Negative for fever and unexpected weight change  HENT: Negative for trouble swallowing  Eyes: Negative for visual disturbance  Respiratory: Positive for cough  Negative for shortness of breath and wheezing  Cardiovascular: Negative for chest pain and palpitations  Gastrointestinal: Negative for constipation, diarrhea, nausea and vomiting  Endocrine: Negative for cold intolerance, heat intolerance and polydipsia  Excessive thirst   Genitourinary: Negative for difficulty urinating and frequency  Musculoskeletal: Positive for arthralgias and myalgias  Negative for gait problem and joint swelling  Skin: Negative for rash  Neurological: Negative for dizziness, seizures, syncope, weakness and headaches  Hematological: Does not bruise/bleed easily  Psychiatric/Behavioral: Negative for dysphoric mood  Depression   All other systems reviewed and are negative  Patient Active Problem List   Diagnosis    Lumbar radiculopathy    Acute right-sided low back pain with right-sided sciatica       Past Medical History:   Diagnosis Date    Sleep apnea     Hx  post deviated septum repair       Past Surgical History:   Procedure Laterality Date    EYE SURGERY Left     metal in eye    KNEE BIOPSY      NOSE SURGERY      rhinoplasty, septoplasty    TENDON RELEASE Right 2017    Procedure: RIGHT ELBOW EXTENSOR TENDON REPAIR;  Surgeon: Sarah Mattson MD;  Location: [facility] [place] MAIN OR;  Service:        Family History   Problem Relation Age of Onset    Hypertension Mother     No Known Problems Father     Asthma Sister     Hypertension Maternal Grandfather        Social History     Occupational History    Not on file   Tobacco Use    Smoking status: Former Smoker     Packs/day: 0 75     Years: 15 00     Pack years: 11 25     Last attempt to quit:      Years since quittin 9    Smokeless tobacco: Never Used   Substance and Sexual Activity    Alcohol use: Yes     Comment: socially    Drug use: No    Sexual activity: Not on file       No current outpatient medications on file      No Known Allergies    Physical Exam:    /87   Pulse 86   Ht 5' 7 5" (1 715 m)   Wt 118 kg (259 lb 12 8 oz)   BMI 40 09 kg/m²     Constitutional: obese  Eyes: anicteric  HEENT: grossly intact  Neck: supple, symmetric, trachea midline and no masses   Pulmonary:even and unlabored  Cardiovascular:No edema or pitting edema present  Skin:Normal without rashes or lesions and well hydrated  Psychiatric:Mood and affect appropriate  Neurologic:Cranial Nerves II-XII grossly intact  Musculoskeletal:antalgic    Imaging  MRI lumbar spine without contrast    (Results Pending)         Orders Placed This Encounter   Procedures    MRI lumbar spine without contrast

## 2020-01-06 ENCOUNTER — TELEPHONE (OUTPATIENT)
Dept: PAIN MEDICINE | Facility: CLINIC | Age: 35
End: 2020-01-06

## 2020-01-06 NOTE — TELEPHONE ENCOUNTER
----- Message from Marijean Lesches, MD sent at 1/6/2020  8:44 AM EST -----  Regarding: MRI L-spine results  MRI of the lumbar spine shows a right-sided L3-L4 lumbar disc herniation  I think the patient would benefit from a right L3 and L4 transforaminal epidural steroid injection since this could be potentially therapeutic  Please schedule if he is interested      Proc:  Right L3 and L4 TFESI  Dx:  M51 16, M54 5  CPT:  [id number] and [id number]

## 2020-01-06 NOTE — TELEPHONE ENCOUNTER
S/w pt, informed him of results  Pt said he needs to discuss this with his nurse  and she will have to coordinate setting up the injection for him  Will await call from her

## 2020-01-13 ENCOUNTER — OFFICE VISIT (OUTPATIENT)
Dept: PAIN MEDICINE | Facility: CLINIC | Age: 35
End: 2020-01-13
Payer: OTHER MISCELLANEOUS

## 2020-01-13 VITALS
HEART RATE: 80 BPM | HEIGHT: 68 IN | WEIGHT: 262.2 LBS | BODY MASS INDEX: 39.74 KG/M2 | SYSTOLIC BLOOD PRESSURE: 137 MMHG | DIASTOLIC BLOOD PRESSURE: 85 MMHG

## 2020-01-13 DIAGNOSIS — M54.41 ACUTE RIGHT-SIDED LOW BACK PAIN WITH RIGHT-SIDED SCIATICA: Primary | ICD-10-CM

## 2020-01-13 DIAGNOSIS — M51.16 LUMBAR DISC HERNIATION WITH RADICULOPATHY: ICD-10-CM

## 2020-01-13 PROBLEM — M54.16 LUMBAR RADICULOPATHY: Status: RESOLVED | Noted: 2019-12-30 | Resolved: 2020-01-13

## 2020-01-13 PROCEDURE — 99214 OFFICE O/P EST MOD 30 MIN: CPT | Performed by: ANESTHESIOLOGY

## 2020-01-13 NOTE — PROGRESS NOTES
Pain Medicine Follow-Up Note    Assessment:  1  Acute right-sided low back pain with right-sided sciatica    2  Lumbar disc herniation with radiculopathy        Plan:  My impressions and treatment recommendations were discussed in detail with the patient who verbalized understanding and had no further questions  The patient is complaining of low back pain with some radiation into his right groin region  The MRI of the lumbar spine does show a right-sided lumbar herniated disc that could potentially be causing his pain  I did discuss with the patient that he may benefit from a right L3 and L4 transforaminal epidural steroid injection since this could be potentially therapeutic  The procedures, its risks, and benefits were explained in detail to the patient  Risks include but are not limited to bleeding, infection, hematoma formation, abscess formation, weakness, headache, failure the pain to improve, nerve irritation or damage, and potential worsening of the pain  The patient verbalized understanding and wished to proceed with the procedure  If the patient does not respond to the transforaminal epidural steroid injections, I asked him to follow-up with his other physician regarding possible hernia surgery  If he does respond to this than his symptoms are being caused by the lumbar herniated disc  Follow-up is planned in 4 weeks time or sooner as warranted  Discharge instructions were provided  I personally saw and examined the patient and I agree with the above discussed plan of care  History of Present Illness:    Haily Torres is a 37 y o  male who presents to [facility] for interval re-evaluation of the above stated pain complaints  The patient has a past medical and chronic pain history as outlined in the assessment section  He was last seen on December 30, 2019  At today's office visit, the patient's pain score is 6 5/10 on the verbal numerical pain rating scale  The patient states that his pain is worse in the morning, evening, and night  His pain is constant in nature  He reports the quality of his pain as sharp, throbbing, shooting, numbness, and pins and needles    He is reporting pain primarily in his low back and right groin region with some radiation into his right anterior thigh  He is here today to review the MRI results which does show a right-sided L3-L4 lumbar disc herniation  Other than as stated above, the patient denies any interval changes in medications, medical condition, mental condition, symptoms, or allergies since the last office visit  Review of Systems:    Review of Systems   Constitutional: Negative for appetite change  HENT: Negative for facial swelling, mouth sores and postnasal drip  Eyes: Negative for discharge and redness  Respiratory: Negative for cough, choking and chest tightness  Gastrointestinal: Negative for diarrhea, nausea and rectal pain  Endocrine: Negative for heat intolerance  Genitourinary: Negative for discharge, genital sores, scrotal swelling and urgency  Musculoskeletal: Positive for back pain  Skin: Negative for pallor, rash and wound  Allergic/Immunologic: Negative for immunocompromised state  Neurological: Negative for dizziness, seizures, speech difficulty and numbness  Hematological: Does not bruise/bleed easily  Psychiatric/Behavioral: Negative for dysphoric mood  The patient is not nervous/anxious and is not hyperactive            Patient Active Problem List   Diagnosis    Acute right-sided low back pain with right-sided sciatica    Lumbar disc herniation with radiculopathy       Past Medical History:   Diagnosis Date    Sleep apnea     Hx 2014 post deviated septum repair       Past Surgical History:   Procedure Laterality Date    EYE SURGERY Left     metal in eye    KNEE BIOPSY      NOSE SURGERY      rhinoplasty, septoplasty    TENDON RELEASE Right 5/1/2017    Procedure: RIGHT ELBOW EXTENSOR TENDON REPAIR;  Surgeon: Tarik Hernandez MD;  Location: [facility] MAIN OR;  Service:        Family History   Problem Relation Age of Onset    Hypertension Mother     No Known Problems Father     Asthma Sister     Hypertension Maternal Grandfather     No Known Problems Brother     No Known Problems Daughter     No Known Problems Son     No Known Problems Maternal Aunt     No Known Problems Maternal Uncle     No Known Problems Paternal Aunt     No Known Problems Paternal Uncle     No Known Problems Maternal Grandmother     No Known Problems Paternal Grandmother     No Known Problems Paternal Grandfather        Social History     Occupational History    Not on file   Tobacco Use    Smoking status: Former Smoker     Packs/day: 0 75     Years: 15 00     Pack years: 11 25     Last attempt to quit:      Years since quittin 0    Smokeless tobacco: Never Used   Substance and Sexual Activity    Alcohol use: Yes     Comment: socially    Drug use: No    Sexual activity: Not on file       No current outpatient medications on file      No Known Allergies    Physical Exam:    /85   Pulse 80   Ht 5' 7 5" (1 715 m)   Wt 119 kg (262 lb 3 2 oz)   BMI 40 46 kg/m²     Constitutional:obese  Eyes:anicteric  HEENT:grossly intact  Neck:supple, symmetric, trachea midline and no masses   Pulmonary:even and unlabored  Cardiovascular:No edema or pitting edema present  Skin:Normal without rashes or lesions and well hydrated  Psychiatric:Mood and affect appropriate  Neurologic:Cranial Nerves II-XII grossly intact  Musculoskeletal:antalgic      Imaging    MRI Lumbar Spine 19

## 2020-01-17 ENCOUNTER — OFFICE VISIT (OUTPATIENT)
Dept: OBGYN CLINIC | Facility: CLINIC | Age: 35
End: 2020-01-17
Payer: OTHER MISCELLANEOUS

## 2020-01-17 ENCOUNTER — TELEPHONE (OUTPATIENT)
Dept: PAIN MEDICINE | Facility: MEDICAL CENTER | Age: 35
End: 2020-01-17

## 2020-01-17 VITALS
DIASTOLIC BLOOD PRESSURE: 84 MMHG | SYSTOLIC BLOOD PRESSURE: 128 MMHG | BODY MASS INDEX: 40.06 KG/M2 | HEART RATE: 80 BPM | WEIGHT: 259.6 LBS

## 2020-01-17 DIAGNOSIS — K40.90 INGUINAL HERNIA OF RIGHT SIDE WITHOUT OBSTRUCTION OR GANGRENE: Primary | ICD-10-CM

## 2020-01-17 DIAGNOSIS — M54.16 RADICULOPATHY, LUMBAR REGION: ICD-10-CM

## 2020-01-17 PROCEDURE — 99214 OFFICE O/P EST MOD 30 MIN: CPT | Performed by: ORTHOPAEDIC SURGERY

## 2020-01-17 NOTE — TELEPHONE ENCOUNTER
Pt needs a procedure done  Call Ghulam Ramachandran at [facility]      Ghulam Ramachandran can be reached at [phone]

## 2020-01-17 NOTE — LETTER
January 17, 2020     Patient: Sheba Soto   YOB: 1985   Date of Visit: 1/17/2020       To Whom it May Concern:    Sheba Soto is under my professional care  He was seen in my office on 1/17/2020  He remains restricted from work at this time  If you have any questions or concerns, please don't hesitate to call           Sincerely,          Tien Warren MD        CC: No Recipients

## 2020-01-17 NOTE — PROGRESS NOTES
Assessment/Plan:  1  Inguinal hernia of right side without obstruction or gangrene     2  Radiculopathy, lumbar region         Scribe Attestation    I,:   Deangelo Escalera am acting as a scribe while in the presence of the attending physician :        I,:   Everton Richardson MD personally performed the services described in this documentation    as scribed in my presence :              Upon review of reports of multiple studies including CT scan, MRI of the lumbar spine, MRI of the hip and pelvis and my clinical examination it is in my opinion that [address] [place] [address] chronic hip pain is directly related to an inguinal hernia which is evident upon CT scan  Shiva's physical therapy pre reports were reviewed and he is showing no improvement with conservative efforts to treat a hip and groin strain  In addition I believe [employer] has a secondary issue of disc herniation at levels L3-L4 which is causing the numbness and tingling into the lateral thigh  Dr Nunn Neither is addressing this is attempting to schedule an injection to provide relief of the symptoms  I feel return visit to his general surgeon to reassess for inguinal hernia is warranted  [employer] has failed conservative efforts of physical therapy where we were treating for a hip strain  The MRI of the right hip questioning and a tear to a hip flexor was negative  [employer] verbally stated he understood that he needs to return to his general surgeon for re-evaluation  He will keep his appointment with Dr Nunn Neither for steroid injection to lumbar spine  Subjective:   Cloretta Hashimoto is a 37 y o  male who presents to the office today for ongoing right groin pain since a slip at work on October 21, 2019  [employer] has had multiple studies including a CT scan which was positive for bilateral hernia, MRI of the right hip questioning a hip flexor tear was negative and an MRI of the lumbar spine which was positive for herniation at L3-L4 level  [employer] is not showing any improvement  He states that he is in constant pain  The pain is located in the right groin region and described as sharp and stabbing and can be throbbing at times  His pain will wake him at night  He is unable to sit comfortably unless the right leg is extended and he removes weight upon the right buttock  He continues have the intermittent numbness and tingling into the right lateral thigh  He states the right leg feels weaker than the left  He will apply ice to the groin region particularly when the throbbing pain returns and this provided little relief  Ibuprofen provides no relief  Review of Systems   Constitutional: Negative for chills, fever and unexpected weight change  HENT: Negative for hearing loss, nosebleeds and sore throat  Eyes: Negative for pain, redness and visual disturbance  Respiratory: Negative for cough, shortness of breath and wheezing  Cardiovascular: Negative for chest pain, palpitations and leg swelling  Gastrointestinal: Negative for abdominal pain, nausea and vomiting  Endocrine: Negative for polyphagia and polyuria  Genitourinary: Negative for dysuria and hematuria  Musculoskeletal:        See HPI   Skin: Negative for rash and wound  Neurological: Negative for dizziness, numbness and headaches  Psychiatric/Behavioral: Negative for decreased concentration and suicidal ideas  The patient is not nervous/anxious            Past Medical History:   Diagnosis Date    Sleep apnea     Hx 2014 post deviated septum repair       Past Surgical History:   Procedure Laterality Date    EYE SURGERY Left     metal in eye    KNEE BIOPSY      NOSE SURGERY      rhinoplasty, septoplasty    TENDON RELEASE Right 5/1/2017    Procedure: RIGHT ELBOW EXTENSOR TENDON REPAIR;  Surgeon: Elesa Homans, MD;  Location: [facility] MAIN OR;  Service:        Family History   Problem Relation Age of Onset    Hypertension Mother     No Known Problems Father     Asthma Sister     Hypertension Maternal Grandfather     No Known Problems Brother     No Known Problems Daughter     No Known Problems Son     No Known Problems Maternal Aunt     No Known Problems Maternal Uncle     No Known Problems Paternal Aunt     No Known Problems Paternal Uncle     No Known Problems Maternal Grandmother     No Known Problems Paternal Grandmother     No Known Problems Paternal Grandfather        Social History     Occupational History    Not on file   Tobacco Use    Smoking status: Former Smoker     Packs/day: 0 75     Years: 15 00     Pack years: 11 25     Last attempt to quit: 2013     Years since quittin 0    Smokeless tobacco: Never Used   Substance and Sexual Activity    Alcohol use: Yes     Comment: socially    Drug use: No    Sexual activity: Not on file       No current outpatient medications on file  No Known Allergies    Objective:  Vitals:    20 0925   BP: 128/84   Pulse: 80       Right Hip Exam     Range of Motion   Flexion: 110 (Painful)   External rotation: 60 (Painful)   Internal rotation: 25     Muscle Strength   Right hip normal muscle strength: 4/5 tibialis anterior, 4/5 extensor hallucis longus  Abduction: 4/5   Adduction: 4/5   Flexion: 4/5     Other   Sensation: decreased (Lateral hip and thigh)      Back Exam     Muscle Strength   Right Quadriceps:  4/5   Left Quadriceps:  5/5     Tests   Straight leg raise right: positive    Reflexes   Patellar: normal    Other   Sensation: decreased (L3-L4 distribution)            Physical Exam   Constitutional: He is oriented to person, place, and time  He appears well-developed and well-nourished  HENT:   Head: Normocephalic and atraumatic  Eyes: Conjunctivae are normal  Right eye exhibits no discharge  Left eye exhibits no discharge  Neck: Normal range of motion  Neck supple  Cardiovascular: Regular rhythm and intact distal pulses  Pulmonary/Chest: Effort normal  No respiratory distress     Neurological: He is alert and oriented to person, place, and time  Skin: Skin is warm and dry  Psychiatric: He has a normal mood and affect  His behavior is normal    Vitals reviewed  I have personally reviewed pertinent films in PACS and my interpretation is as follows:  MRI lumbar spine was reviewed and demonstrates mild disc bulge at L3-L4 that extends into the foramen    MRI of the right hip is normal

## 2020-01-20 NOTE — TELEPHONE ENCOUNTER
Rodolfo Davis Nurse  called back to have patient scheduled  States she has not received a call back

## 2020-01-21 ENCOUNTER — TELEPHONE (OUTPATIENT)
Dept: OBGYN CLINIC | Facility: HOSPITAL | Age: 35
End: 2020-01-21

## 2020-01-21 NOTE — TELEPHONE ENCOUNTER
Farida Doctor called and asked if we could print out his OVN and work note  He would like to have his abnereTrae stop by to pick them up  Can you please have them ready for her  Please advise

## 2020-01-21 NOTE — TELEPHONE ENCOUNTER
lvm for Ramesh Stovall to call me back so I can get more information and schedule patient   Please transfer to [facility]''  at  [phone]

## 2020-01-22 NOTE — TELEPHONE ENCOUNTER
Spoke with Camron Garcia who provided me with  name   Left message for  to call back with authorization request instructions

## 2020-01-28 ENCOUNTER — TRANSCRIBE ORDERS (OUTPATIENT)
Dept: ADMINISTRATIVE | Facility: HOSPITAL | Age: 35
End: 2020-01-28

## 2020-01-28 ENCOUNTER — APPOINTMENT (OUTPATIENT)
Dept: LAB | Facility: HOSPITAL | Age: 35
End: 2020-01-28
Attending: SURGERY
Payer: OTHER MISCELLANEOUS

## 2020-01-28 ENCOUNTER — CONSULT (OUTPATIENT)
Dept: SURGERY | Facility: CLINIC | Age: 35
End: 2020-01-28
Payer: OTHER MISCELLANEOUS

## 2020-01-28 DIAGNOSIS — K40.90 RIGHT INGUINAL HERNIA: Primary | ICD-10-CM

## 2020-01-28 DIAGNOSIS — K40.90 RIGHT INGUINAL HERNIA: ICD-10-CM

## 2020-01-28 LAB
ANION GAP SERPL CALCULATED.3IONS-SCNC: 10 MMOL/L (ref 4–13)
BUN SERPL-MCNC: 14 MG/DL (ref 5–25)
CALCIUM SERPL-MCNC: 8.3 MG/DL (ref 8.3–10.1)
CHLORIDE SERPL-SCNC: 100 MMOL/L (ref 100–108)
CO2 SERPL-SCNC: 26 MMOL/L (ref 21–32)
CREAT SERPL-MCNC: 0.91 MG/DL (ref 0.6–1.3)
ERYTHROCYTE [DISTWIDTH] IN BLOOD BY AUTOMATED COUNT: 12.1 % (ref 11.6–15.1)
GFR SERPL CREATININE-BSD FRML MDRD: 108 ML/MIN/1.73SQ M
GLUCOSE SERPL-MCNC: 118 MG/DL (ref 65–140)
HCT VFR BLD AUTO: 47.1 % (ref 36.5–49.3)
HGB BLD-MCNC: 16.6 G/DL (ref 12–17)
MCH RBC QN AUTO: 31 PG (ref 26.8–34.3)
MCHC RBC AUTO-ENTMCNC: 35.2 G/DL (ref 31.4–37.4)
MCV RBC AUTO: 88 FL (ref 82–98)
PLATELET # BLD AUTO: 366 THOUSANDS/UL (ref 149–390)
PMV BLD AUTO: 10.5 FL (ref 8.9–12.7)
POTASSIUM SERPL-SCNC: 4.1 MMOL/L (ref 3.5–5.3)
RBC # BLD AUTO: 5.35 MILLION/UL (ref 3.88–5.62)
SODIUM SERPL-SCNC: 136 MMOL/L (ref 136–145)
WBC # BLD AUTO: 9.11 THOUSAND/UL (ref 4.31–10.16)

## 2020-01-28 PROCEDURE — 85027 COMPLETE CBC AUTOMATED: CPT

## 2020-01-28 PROCEDURE — 99244 OFF/OP CNSLTJ NEW/EST MOD 40: CPT | Performed by: SURGERY

## 2020-01-28 PROCEDURE — 80048 BASIC METABOLIC PNL TOTAL CA: CPT

## 2020-01-28 PROCEDURE — 36415 COLL VENOUS BLD VENIPUNCTURE: CPT

## 2020-01-28 RX ORDER — CEFAZOLIN SODIUM 2 G/50ML
2000 SOLUTION INTRAVENOUS ONCE
Status: CANCELLED | OUTPATIENT
Start: 2020-01-28 | End: 2020-01-28

## 2020-01-28 NOTE — TELEPHONE ENCOUNTER
Spoke with Dustin Everett still have not heard back from    Dustin Everett is going to reach out to  then call me back

## 2020-01-28 NOTE — H&P (VIEW-ONLY)
[address] [place] [address] [facility] History and Physical Note:    Assessment:  Right inguinal fat containing hernia versus cord lipoma  Also a smaller amount of adipose tissue in the left spermatic cord  Plan:  Right groin exploration and inguinal hernia repair with mesh  I explained, at length, that the CT findings could represent a cord lipoma and not a hernia  It is very difficult to discern between the two even with an ultrasound exam under Valsalva  I also explained that his inguinal hernia is not related to his back or right hip pain or lateral thigh tingling  I also discussed laparoscopic preperitoneal exploration of both groins with mesh placement if hernias are present, but this will not address the possible cord lipoma  I reviewed the risks and complications of surgery including chronic pain at the site of surgery  Patient understands, has had all questions answered, and desires to proceed with right groin exploration, removal of the cord lipoma and mesh repair should a hernia be present    Chief Complaint:  I am here for the hernia    HPI  Patient is a 57-year-old male referred to my office for a right inguinal hernia  Patient has had an extensive workup by Orthopedics for his back and right hip pain as well as his right lateral thigh numbness  The summary of Dr Pedro Wilson evaluation:  "Upon review of reports of multiple studies including CT scan, MRI of the lumbar spine, MRI of the hip and pelvis and my clinical examination it is in my opinion that [address] [place] [address] chronic hip pain is directly related to an inguinal hernia which is evident upon CT scan  Shiva's physical therapy pre reports were reviewed and he is showing no improvement with conservative efforts to treat a hip and groin strain    In addition I believe Hector Perkins has a secondary issue of disc herniation at levels L3-L4 which is causing the numbness and tingling into the lateral thigh"      PMH:  Past Medical History:   Diagnosis Date    Sleep apnea     Hx  post deviated septum repair       PSH:  Past Surgical History:   Procedure Laterality Date    EYE SURGERY Left     metal in eye    KNEE BIOPSY      NOSE SURGERY      rhinoplasty, septoplasty    TENDON RELEASE Right 2017    Procedure: RIGHT ELBOW EXTENSOR TENDON REPAIR;  Surgeon: Lana Juarez MD;  Location: [facility] MAIN OR;  Service:        Home Meds:  No current outpatient medications on file prior to visit  No current facility-administered medications on file prior to visit          Allergies:  No Known Allergies    Social Hx:  Social History     Socioeconomic History    Marital status: Single     Spouse name: Not on file    Number of children: Not on file    Years of education: Not on file    Highest education level: Not on file   Occupational History    Not on file   Social Needs    Financial resource strain: Not on file    Food insecurity:     Worry: Not on file     Inability: Not on file    Transportation needs:     Medical: Not on file     Non-medical: Not on file   Tobacco Use    Smoking status: Former Smoker     Packs/day: 0 75     Years: 15 00     Pack years: 11 25     Last attempt to quit: 2013     Years since quittin 0    Smokeless tobacco: Never Used   Substance and Sexual Activity    Alcohol use: Yes     Comment: socially    Drug use: No    Sexual activity: Not on file   Lifestyle    Physical activity:     Days per week: Not on file     Minutes per session: Not on file    Stress: Not on file   Relationships    Social connections:     Talks on phone: Not on file     Gets together: Not on file     Attends [employer] service: Not on file     Active member of club or organization: Not on file     Attends meetings of clubs or organizations: Not on file     Relationship status: Not on file    Intimate partner violence:     Fear of current or ex partner: Not on file     Emotionally abused: Not on file     Physically abused: Not on file Forced sexual activity: Not on file   Other Topics Concern    Not on file   Social History Narrative    Not on file        Family Hx:    Family History   Problem Relation Age of Onset    Hypertension Mother     No Known Problems Father     Asthma Sister     Hypertension Maternal Grandfather     No Known Problems Brother     No Known Problems Daughter     No Known Problems Son     No Known Problems Maternal Aunt     No Known Problems Maternal Uncle     No Known Problems Paternal Aunt     No Known Problems Paternal Uncle     No Known Problems Maternal Grandmother     No Known Problems Paternal Grandmother     No Known Problems Paternal Grandfather          Review of Systems   Constitutional:        Morbidly obese in no acute distress   HENT: Negative  Eyes: Negative  Respiratory: Negative  Cardiovascular: Negative  Gastrointestinal: Negative  Endocrine: Negative  Genitourinary: Negative  Musculoskeletal:        See HPI and past medical history   Skin: Negative  Allergic/Immunologic: Negative  Neurological:        See HPI and past medical history   Hematological: Negative  Psychiatric/Behavioral: Negative  There were no vitals taken for this visit  Physical Exam   Constitutional: He is oriented to person, place, and time  Obese in no acute distress   HENT:   Head: Normocephalic and atraumatic  Eyes: Pupils are equal, round, and reactive to light  EOM are normal    Neck: Normal range of motion  Neck supple  Cardiovascular: Normal rate and regular rhythm  Pulmonary/Chest: Effort normal and breath sounds normal    Abdominal: Soft  He exhibits no distension  Patient's morbid obesity hinders exam for his hernia  Patient has a small bulge in his right groin consistent with this CT scan findings  This could represent a fat containing inguinal hernia or cord lipoma      Left groin with no obvious bulge noted on and equally challenging physical exam  Musculoskeletal: Normal range of motion  Lymphadenopathy:     He has no cervical adenopathy  Neurological: He is alert and oriented to person, place, and time  Skin: Skin is warm and dry  Psychiatric: He has a normal mood and affect  His behavior is normal        Pertinent labs reviewed    Pertinent images and available reads personally reviewed  I reviewed the MRI images as well as MRI report  I also reviewed the CT scan images as well as CT scan report  Pertinent notes reviewed  I reviewed the last note by Dr Alicia Camacho MD [id number]  [phone]

## 2020-01-28 NOTE — PROGRESS NOTES
[address] [place] [address] [facility] History and Physical Note:    Assessment:  Right inguinal fat containing hernia versus cord lipoma  Also a smaller amount of adipose tissue in the left spermatic cord  Plan:  Right groin exploration and inguinal hernia repair with mesh  I explained, at length, that the CT findings could represent a cord lipoma and not a hernia  It is very difficult to discern between the two even with an ultrasound exam under Valsalva  I also explained that his inguinal hernia is not related to his back or right hip pain or lateral thigh tingling  I also discussed laparoscopic preperitoneal exploration of both groins with mesh placement if hernias are present, but this will not address the possible cord lipoma  I reviewed the risks and complications of surgery including chronic pain at the site of surgery  Patient understands, has had all questions answered, and desires to proceed with right groin exploration, removal of the cord lipoma and mesh repair should a hernia be present    Chief Complaint:  I am here for the hernia    HPI  Patient is a 78-year-old male referred to my office for a right inguinal hernia  Patient has had an extensive workup by Orthopedics for his back and right hip pain as well as his right lateral thigh numbness  The summary of Dr Rosendo Gaffney evaluation:  "Upon review of reports of multiple studies including CT scan, MRI of the lumbar spine, MRI of the hip and pelvis and my clinical examination it is in my opinion that [address] [place] [address] chronic hip pain is directly related to an inguinal hernia which is evident upon CT scan  Shiva's physical therapy pre reports were reviewed and he is showing no improvement with conservative efforts to treat a hip and groin strain    In addition I believe Chani Rose has a secondary issue of disc herniation at levels L3-L4 which is causing the numbness and tingling into the lateral thigh"      PMH:  Past Medical History:   Diagnosis Date    Sleep apnea     Hx  post deviated septum repair       PSH:  Past Surgical History:   Procedure Laterality Date    EYE SURGERY Left     metal in eye    KNEE BIOPSY      NOSE SURGERY      rhinoplasty, septoplasty    TENDON RELEASE Right 2017    Procedure: RIGHT ELBOW EXTENSOR TENDON REPAIR;  Surgeon: Elesa Homans, MD;  Location[facility] MA[facility];  Service:        Home Meds:  No current outpatient medications on file prior to visit  No current facility-administered medications on file prior to visit          Allergies:  No Known Allergies    Social Hx:  Social History     Socioeconomic History    Marital status: Single     Spouse name: Not on file    Number of children: Not on file    Years of education: Not on file    Highest education level: Not on file   Occupational History    Not on file   Social Needs    Financial resource strain: Not on file    Food insecurity:     Worry: Not on file     Inability: Not on file    Transportation needs:     Medical: Not on file     Non-medical: Not on file   Tobacco Use    Smoking status: Former Smoker     Packs/day: 0 75     Years: 15 00     Pack years: 11 25     Last attempt to quit:      Years since quittin 0    Smokeless tobacco: Never Used   Substance and Sexual Activity    Alcohol use: Yes     Comment: socially    Drug use: No    Sexual activity: Not on file   Lifestyle    Physical activity:     Days per week: Not on file     Minutes per session: Not on file    Stress: Not on file   Relationships    Social connections:     Talks on phone: Not on file     Gets together: Not on file     Attends [employer] service: Not on file     Active member of club or organization: Not on file     Attends meetings of clubs or organizations: Not on file     Relationship status: Not on file    Intimate partner violence:     Fear of current or ex partner: Not on file     Emotionally abused: Not on file     Physically abused: Not on file Forced sexual activity: Not on file   Other Topics Concern    Not on file   Social History Narrative    Not on file        Family Hx:    Family History   Problem Relation Age of Onset    Hypertension Mother     No Known Problems Father     Asthma Sister     Hypertension Maternal Grandfather     No Known Problems Brother     No Known Problems Daughter     No Known Problems Son     No Known Problems Maternal Aunt     No Known Problems Maternal Uncle     No Known Problems Paternal Aunt     No Known Problems Paternal Uncle     No Known Problems Maternal Grandmother     No Known Problems Paternal Grandmother     No Known Problems Paternal Grandfather          Review of Systems   Constitutional:        Morbidly obese in no acute distress   HENT: Negative  Eyes: Negative  Respiratory: Negative  Cardiovascular: Negative  Gastrointestinal: Negative  Endocrine: Negative  Genitourinary: Negative  Musculoskeletal:        See HPI and past medical history   Skin: Negative  Allergic/Immunologic: Negative  Neurological:        See HPI and past medical history   Hematological: Negative  Psychiatric/Behavioral: Negative  There were no vitals taken for this visit  Physical Exam   Constitutional: He is oriented to person, place, and time  Obese in no acute distress   HENT:   Head: Normocephalic and atraumatic  Eyes: Pupils are equal, round, and reactive to light  EOM are normal    Neck: Normal range of motion  Neck supple  Cardiovascular: Normal rate and regular rhythm  Pulmonary/Chest: Effort normal and breath sounds normal    Abdominal: Soft  He exhibits no distension  Patient's morbid obesity hinders exam for his hernia  Patient has a small bulge in his right groin consistent with this CT scan findings  This could represent a fat containing inguinal hernia or cord lipoma      Left groin with no obvious bulge noted on and equally challenging physical exam  Musculoskeletal: Normal range of motion  Lymphadenopathy:     He has no cervical adenopathy  Neurological: He is alert and oriented to person, place, and time  Skin: Skin is warm and dry  Psychiatric: He has a normal mood and affect  His behavior is normal        Pertinent labs reviewed    Pertinent images and available reads personally reviewed  I reviewed the MRI images as well as MRI report  I also reviewed the CT scan images as well as CT scan report  Pertinent notes reviewed  I reviewed the last note by Dr Erin Nolan MD [address] [facility]  [phone]

## 2020-01-28 NOTE — H&P
[address] [place] [address] [facility] History and Physical Note:    Assessment:  Right inguinal fat containing hernia versus cord lipoma  Also a smaller amount of adipose tissue in the left spermatic cord  Plan:  Right groin exploration and inguinal hernia repair with mesh  I explained, at length, that the CT findings could represent a cord lipoma and not a hernia  It is very difficult to discern between the two even with an ultrasound exam under Valsalva  I also explained that his inguinal hernia is not related to his back or right hip pain or lateral thigh tingling  I also discussed laparoscopic preperitoneal exploration of both groins with mesh placement if hernias are present, but this will not address the possible cord lipoma  I reviewed the risks and complications of surgery including chronic pain at the site of surgery  Patient understands, has had all questions answered, and desires to proceed with right groin exploration, removal of the cord lipoma and mesh repair should a hernia be present    Chief Complaint:  I am here for the hernia    HPI  Patient is a 25-year-old male referred to my office for a right inguinal hernia  Patient has had an extensive workup by Orthopedics for his back and right hip pain as well as his right lateral thigh numbness  The summary of Dr Linda Pereyra evaluation:  "Upon review of reports of multiple studies including CT scan, MRI of the lumbar spine, MRI of the hip and pelvis and my clinical examination it is in my opinion that [address] [place] [address] chronic hip pain is directly related to an inguinal hernia which is evident upon CT scan  Shiva's physical therapy pre reports were reviewed and he is showing no improvement with conservative efforts to treat a hip and groin strain    In addition I believe Nolan Orellana has a secondary issue of disc herniation at levels L3-L4 which is causing the numbness and tingling into the lateral thigh"      PMH:  Past Medical History:   Diagnosis Date    Sleep apnea     Hx  post deviated septum repair       PSH:  Past Surgical History:   Procedure Laterality Date    EYE SURGERY Left     metal in eye    KNEE BIOPSY      NOSE SURGERY      rhinoplasty, septoplasty    TENDON RELEASE Right 2017    Procedure: RIGHT ELBOW EXTENSOR TENDON REPAIR;  Surgeon: Lorena Mtz MD;  Location: [facility] MAIN OR;  Service:        Home Meds:  No current outpatient medications on file prior to visit  No current facility-administered medications on file prior to visit          Allergies:  No Known Allergies    Social Hx:  Social History     Socioeconomic History    Marital status: Single     Spouse name: Not on file    Number of children: Not on file    Years of education: Not on file    Highest education level: Not on file   Occupational History    Not on file   Social Needs    Financial resource strain: Not on file    Food insecurity:     Worry: Not on file     Inability: Not on file    Transportation needs:     Medical: Not on file     Non-medical: Not on file   Tobacco Use    Smoking status: Former Smoker     Packs/day: 0 75     Years: 15 00     Pack years: 11 25     Last attempt to quit: 2013     Years since quittin 0    Smokeless tobacco: Never Used   Substance and Sexual Activity    Alcohol use: Yes     Comment: socially    Drug use: No    Sexual activity: Not on file   Lifestyle    Physical activity:     Days per week: Not on file     Minutes per session: Not on file    Stress: Not on file   Relationships    Social connections:     Talks on phone: Not on file     Gets together: Not on file     Attends Hinduism service: Not on file     Active member of club or organization: Not on file     Attends meetings of clubs or organizations: Not on file     Relationship status: Not on file    Intimate partner violence:     Fear of current or ex partner: Not on file     Emotionally abused: Not on file     Physically abused: Not on file Forced sexual activity: Not on file   Other Topics Concern    Not on file   Social History Narrative    Not on file        Family Hx:    Family History   Problem Relation Age of Onset    Hypertension Mother     No Known Problems Father     Asthma Sister     Hypertension Maternal Grandfather     No Known Problems Brother     No Known Problems Daughter     No Known Problems Son     No Known Problems Maternal Aunt     No Known Problems Maternal Uncle     No Known Problems Paternal Aunt     No Known Problems Paternal Uncle     No Known Problems Maternal Grandmother     No Known Problems Paternal Grandmother     No Known Problems Paternal Grandfather          Review of Systems   Constitutional:        Morbidly obese in no acute distress   HENT: Negative  Eyes: Negative  Respiratory: Negative  Cardiovascular: Negative  Gastrointestinal: Negative  Endocrine: Negative  Genitourinary: Negative  Musculoskeletal:        See HPI and past medical history   Skin: Negative  Allergic/Immunologic: Negative  Neurological:        See HPI and past medical history   Hematological: Negative  Psychiatric/Behavioral: Negative  There were no vitals taken for this visit  Physical Exam   Constitutional: He is oriented to person, place, and time  Obese in no acute distress   HENT:   Head: Normocephalic and atraumatic  Eyes: Pupils are equal, round, and reactive to light  EOM are normal    Neck: Normal range of motion  Neck supple  Cardiovascular: Normal rate and regular rhythm  Pulmonary/Chest: Effort normal and breath sounds normal    Abdominal: Soft  He exhibits no distension  Patient's morbid obesity hinders exam for his hernia  Patient has a small bulge in his right groin consistent with this CT scan findings  This could represent a fat containing inguinal hernia or cord lipoma      Left groin with no obvious bulge noted on and equally challenging physical exam  Musculoskeletal: Normal range of motion  Lymphadenopathy:     He has no cervical adenopathy  Neurological: He is alert and oriented to person, place, and time  Skin: Skin is warm and dry  Psychiatric: He has a normal mood and affect  His behavior is normal        Pertinent labs reviewed    Pertinent images and available reads personally reviewed  I reviewed the MRI images as well as MRI report  I also reviewed the CT scan images as well as CT scan report  Pertinent notes reviewed  I reviewed the last note by Dr Mala Field MD [address] [facility]  [phone]

## 2020-01-29 NOTE — TELEPHONE ENCOUNTER
Spoke to [employer]  and she states this is not part of his WC claim  They are not approving an Epidural  Spoke to patient and he was not happy said he will be speaking to his  and will call back

## 2020-01-29 NOTE — PRE-PROCEDURE INSTRUCTIONS
No outpatient medications have been marked as taking for the 1/31/20 encounter [facility] Encounter)  Pt is not currently taking any Rx nor OTC medications at this time   Silvano Harper [phone]  My Surgical Experience    The following information was developed to assist you to prepare for your operation  What do I need to do before coming to the hospital?   Arrange for a responsible person to drive you to and from the hospital    Arrange care for your children at home  Children are not allowed in the recovery areas of the hospital   Plan to wear clothing that is easy to put on and take off  If you are having shoulder surgery, wear a shirt that buttons or zippers in the front  Bathing  o Shower the evening before and the morning of your surgery with an antibacterial soap  Please refer to the Pre Op Showering Instructions for Surgery Patients Sheet   o Remove nail polish and all body piercing jewelry  o Do not shave any body part for at least 24 hours before surgery-this includes face, arms, legs and upper body  Food  o Nothing to eat or drink after midnight the night before your surgery  This includes candy and chewing gum  o Exception: If your surgery is after 12:00pm (noon), you may have clear liquids such as 7-Up®, ginger ale, apple or cranberry juice, Jell-O®, water, or clear broth until 8:00 am  o Do not drink milk or juice with pulp on the morning before surgery  o Do not drink alcohol 24 hours before surgery  Medicine  o Follow instructions you received from your surgeon about which medicines you may take on the day of surgery  o If instructed to take medicine on the morning of surgery, take pills with just a small sip of water   Call your prescribing doctor for specific information on what to do if you take insulin    What should I bring to the hospital?    Bring:  Kalli Moreno or a walker, if you have them, for foot or knee surgery   A list of the daily medicines, vitamins, minerals, herbals and nutritional supplements you take  Include the dosages of medicines and the time you take them each day   Glasses, dentures or hearing aids   Minimal clothing; you will be wearing hospital sleepwear   Photo ID; required to verify your identity   If you have a Living Will or Power of , bring a copy of the documents   If you have an ostomy, bring an extra pouch and any supplies you use    Do not bring   Medicines or inhalers   Money, valuables or jewelry    What other information should I know about the day of surgery?  Notify your surgeons if you develop a cold, sore throat, cough, fever, rash or any other illness   Report to the Ambulatory Surgical/Same Day Surgery Unit   You will be instructed to stop at Registration only if you have not been pre-registered   Inform your  fi they do not stay that they will be asked by the staff to leave a phone number where they can be reached   Be available to be reached before surgery  In the event the operating room schedule changes, you may be asked to come in earlier or later than expected    *It is important to tell your doctor and others involved in your health care if you are taking or have been taking any non-prescription drugs, vitamins, minerals, herbals or other nutritional supplements   Any of these may interact with some food or medicines and cause a reaction

## 2020-01-30 ENCOUNTER — ANESTHESIA EVENT (OUTPATIENT)
Dept: PERIOP | Facility: HOSPITAL | Age: 35
End: 2020-01-30
Payer: OTHER MISCELLANEOUS

## 2020-01-30 NOTE — ANESTHESIA PREPROCEDURE EVALUATION
Review of Systems/Medical History  Patient summary reviewed  Chart reviewed  No history of anesthetic complications     Cardiovascular   Pulmonary  Smoker ex-smoker  , Sleep apnea (s/p UPP) ,        GI/Hepatic    GERD ,             Endo/Other    Obesity  morbid obesity   GYN       Hematology   Musculoskeletal  Back pain , lumbar pain, Sciatica (right),        Neurology   Psychology           Physical Exam    Airway    Mallampati score: III  TM Distance: >3 FB  Neck ROM: full     Dental       Cardiovascular  Rhythm: regular, Rate: normal,     Pulmonary  Breath sounds clear to auscultation,     Other Findings        Anesthesia Plan  ASA Score- 2     Anesthesia Type- general with ASA Monitors  Additional Monitors:   Airway Plan: LMA  Plan Factors-    Induction- intravenous  Postoperative Plan- Plan for postoperative opioid use  Planned trial extubation    Informed Consent- Anesthetic plan and risks discussed with patient  I personally reviewed this patient with the CRNA  Discussed and agreed on the Anesthesia Plan with the CRNA  Abdulkadir Martin

## 2020-01-31 ENCOUNTER — ANESTHESIA (OUTPATIENT)
Dept: PERIOP | Facility: HOSPITAL | Age: 35
End: 2020-01-31
Payer: OTHER MISCELLANEOUS

## 2020-01-31 ENCOUNTER — HOSPITAL ENCOUNTER (OUTPATIENT)
Facility: HOSPITAL | Age: 35
Setting detail: OUTPATIENT SURGERY
Discharge: HOME | End: 2020-01-31
Attending: SURGERY | Admitting: SURGERY
Payer: OTHER MISCELLANEOUS

## 2020-01-31 VITALS
WEIGHT: 264 LBS | HEART RATE: 78 BPM | DIASTOLIC BLOOD PRESSURE: 81 MMHG | TEMPERATURE: 97.6 F | RESPIRATION RATE: 16 BRPM | SYSTOLIC BLOOD PRESSURE: 127 MMHG | BODY MASS INDEX: 40.01 KG/M2 | OXYGEN SATURATION: 98 % | HEIGHT: 68 IN

## 2020-01-31 DIAGNOSIS — K40.90 RIGHT INGUINAL HERNIA: Primary | ICD-10-CM

## 2020-01-31 PROCEDURE — C1781 MESH (IMPLANTABLE): HCPCS | Performed by: SURGERY

## 2020-01-31 PROCEDURE — 49505 PRP I/HERN INIT REDUC >5 YR: CPT | Performed by: PHYSICIAN ASSISTANT

## 2020-01-31 PROCEDURE — 49505 PRP I/HERN INIT REDUC >5 YR: CPT | Performed by: SURGERY

## 2020-01-31 DEVICE — BARD MESH
Type: IMPLANTABLE DEVICE | Status: FUNCTIONAL
Brand: BARD MESH

## 2020-01-31 RX ORDER — MAGNESIUM HYDROXIDE 1200 MG/15ML
LIQUID ORAL AS NEEDED
Status: DISCONTINUED | OUTPATIENT
Start: 2020-01-31 | End: 2020-01-31 | Stop reason: HOSPADM

## 2020-01-31 RX ORDER — FENTANYL CITRATE/PF 50 MCG/ML
25 SYRINGE (ML) INJECTION
Status: DISCONTINUED | OUTPATIENT
Start: 2020-01-31 | End: 2020-01-31 | Stop reason: HOSPADM

## 2020-01-31 RX ORDER — CEFAZOLIN SODIUM 2 G/50ML
2000 SOLUTION INTRAVENOUS ONCE
Status: COMPLETED | OUTPATIENT
Start: 2020-01-31 | End: 2020-01-31

## 2020-01-31 RX ORDER — FENTANYL CITRATE 50 UG/ML
INJECTION, SOLUTION INTRAMUSCULAR; INTRAVENOUS AS NEEDED
Status: DISCONTINUED | OUTPATIENT
Start: 2020-01-31 | End: 2020-02-02 | Stop reason: SURG

## 2020-01-31 RX ORDER — LIDOCAINE HYDROCHLORIDE 10 MG/ML
INJECTION, SOLUTION EPIDURAL; INFILTRATION; INTRACAUDAL; PERINEURAL AS NEEDED
Status: DISCONTINUED | OUTPATIENT
Start: 2020-01-31 | End: 2020-02-02 | Stop reason: SURG

## 2020-01-31 RX ORDER — OXYCODONE HYDROCHLORIDE AND ACETAMINOPHEN 5; 325 MG/1; MG/1
1 TABLET ORAL EVERY 4 HOURS PRN
Qty: 12 TABLET | Refills: 0 | Status: SHIPPED | OUTPATIENT
Start: 2020-01-31 | End: 2020-06-29

## 2020-01-31 RX ORDER — BUPIVACAINE HYDROCHLORIDE AND EPINEPHRINE 5; 5 MG/ML; UG/ML
INJECTION, SOLUTION PERINEURAL AS NEEDED
Status: DISCONTINUED | OUTPATIENT
Start: 2020-01-31 | End: 2020-01-31 | Stop reason: HOSPADM

## 2020-01-31 RX ORDER — ONDANSETRON 2 MG/ML
4 INJECTION INTRAMUSCULAR; INTRAVENOUS ONCE AS NEEDED
Status: COMPLETED | OUTPATIENT
Start: 2020-01-31 | End: 2020-01-31

## 2020-01-31 RX ORDER — KETOROLAC TROMETHAMINE 30 MG/ML
INJECTION, SOLUTION INTRAMUSCULAR; INTRAVENOUS AS NEEDED
Status: DISCONTINUED | OUTPATIENT
Start: 2020-01-31 | End: 2020-02-02 | Stop reason: SURG

## 2020-01-31 RX ORDER — SODIUM CHLORIDE, SODIUM LACTATE, POTASSIUM CHLORIDE, CALCIUM CHLORIDE 600; 310; 30; 20 MG/100ML; MG/100ML; MG/100ML; MG/100ML
75 INJECTION, SOLUTION INTRAVENOUS CONTINUOUS
Status: DISCONTINUED | OUTPATIENT
Start: 2020-01-31 | End: 2020-01-31 | Stop reason: HOSPADM

## 2020-01-31 RX ORDER — MIDAZOLAM HYDROCHLORIDE 2 MG/2ML
INJECTION, SOLUTION INTRAMUSCULAR; INTRAVENOUS AS NEEDED
Status: DISCONTINUED | OUTPATIENT
Start: 2020-01-31 | End: 2020-02-02 | Stop reason: SURG

## 2020-01-31 RX ORDER — PROPOFOL 10 MG/ML
INJECTION, EMULSION INTRAVENOUS AS NEEDED
Status: DISCONTINUED | OUTPATIENT
Start: 2020-01-31 | End: 2020-02-02 | Stop reason: SURG

## 2020-01-31 RX ORDER — ONDANSETRON 2 MG/ML
INJECTION INTRAMUSCULAR; INTRAVENOUS AS NEEDED
Status: DISCONTINUED | OUTPATIENT
Start: 2020-01-31 | End: 2020-02-02 | Stop reason: SURG

## 2020-01-31 RX ORDER — DEXAMETHASONE SODIUM PHOSPHATE 4 MG/ML
INJECTION, SOLUTION INTRA-ARTICULAR; INTRALESIONAL; INTRAMUSCULAR; INTRAVENOUS; SOFT TISSUE AS NEEDED
Status: DISCONTINUED | OUTPATIENT
Start: 2020-01-31 | End: 2020-02-02 | Stop reason: SURG

## 2020-01-31 RX ORDER — OXYCODONE HYDROCHLORIDE AND ACETAMINOPHEN 5; 325 MG/1; MG/1
1 TABLET ORAL EVERY 4 HOURS PRN
Status: DISCONTINUED | OUTPATIENT
Start: 2020-01-31 | End: 2020-01-31 | Stop reason: HOSPADM

## 2020-01-31 RX ADMIN — FENTANYL CITRATE 50 MCG: 50 INJECTION, SOLUTION INTRAMUSCULAR; INTRAVENOUS at 10:50

## 2020-01-31 RX ADMIN — FENTANYL CITRATE 50 MCG: 50 INJECTION, SOLUTION INTRAMUSCULAR; INTRAVENOUS at 10:58

## 2020-01-31 RX ADMIN — FENTANYL CITRATE 25 MCG: 50 INJECTION, SOLUTION INTRAMUSCULAR; INTRAVENOUS at 12:35

## 2020-01-31 RX ADMIN — MIDAZOLAM HYDROCHLORIDE 2 MG: 1 INJECTION, SOLUTION INTRAMUSCULAR; INTRAVENOUS at 10:43

## 2020-01-31 RX ADMIN — ONDANSETRON 4 MG: 2 INJECTION INTRAMUSCULAR; INTRAVENOUS at 12:21

## 2020-01-31 RX ADMIN — LIDOCAINE HYDROCHLORIDE 50 MG: 10 INJECTION, SOLUTION EPIDURAL; INFILTRATION; INTRACAUDAL; PERINEURAL at 10:50

## 2020-01-31 RX ADMIN — FENTANYL CITRATE 25 MCG: 50 INJECTION, SOLUTION INTRAMUSCULAR; INTRAVENOUS at 11:22

## 2020-01-31 RX ADMIN — PROPOFOL 200 MG: 10 INJECTION, EMULSION INTRAVENOUS at 10:50

## 2020-01-31 RX ADMIN — SODIUM CHLORIDE, SODIUM LACTATE, POTASSIUM CHLORIDE, AND CALCIUM CHLORIDE 75 ML/HR: .6; .31; .03; .02 INJECTION, SOLUTION INTRAVENOUS at 09:40

## 2020-01-31 RX ADMIN — ONDANSETRON 4 MG: 2 INJECTION INTRAMUSCULAR; INTRAVENOUS at 11:04

## 2020-01-31 RX ADMIN — FENTANYL CITRATE 50 MCG: 50 INJECTION, SOLUTION INTRAMUSCULAR; INTRAVENOUS at 11:33

## 2020-01-31 RX ADMIN — DEXAMETHASONE SODIUM PHOSPHATE 8 MG: 4 INJECTION, SOLUTION INTRA-ARTICULAR; INTRALESIONAL; INTRAMUSCULAR; INTRAVENOUS; SOFT TISSUE at 11:04

## 2020-01-31 RX ADMIN — CEFAZOLIN SODIUM 2000 MG: 2 SOLUTION INTRAVENOUS at 10:43

## 2020-01-31 RX ADMIN — SODIUM CHLORIDE, SODIUM LACTATE, POTASSIUM CHLORIDE, AND CALCIUM CHLORIDE: .6; .31; .03; .02 INJECTION, SOLUTION INTRAVENOUS at 10:43

## 2020-01-31 RX ADMIN — FENTANYL CITRATE 25 MCG: 50 INJECTION, SOLUTION INTRAMUSCULAR; INTRAVENOUS at 12:54

## 2020-01-31 RX ADMIN — OXYCODONE HYDROCHLORIDE AND ACETAMINOPHEN 1 TABLET: 5; 325 TABLET ORAL at 13:17

## 2020-01-31 RX ADMIN — FENTANYL CITRATE 25 MCG: 50 INJECTION, SOLUTION INTRAMUSCULAR; INTRAVENOUS at 11:02

## 2020-01-31 RX ADMIN — KETOROLAC TROMETHAMINE 30 MG: 30 INJECTION, SOLUTION INTRAMUSCULAR at 11:33

## 2020-01-31 NOTE — PERIOPERATIVE NURSING NOTE
hystcryl itact right groin area clean and dry slightecchymosis noted at site / ice to site / taking and retaining po fluids

## 2020-01-31 NOTE — OP NOTE
OPERATIVE REPORT  PATIENT NAME: Lisa Russell    :  1985  MRN: [id number]  Pt Location: [facility] OR ROOM 01    SURGERY DATE: 2020    Surgeon(s) and Role:     * Linda Zuluaga MD - Primary     * Roland Duarte PA-C - Assisting    Preop Diagnosis:  Right inguinal hernia [K40 90]    Post-Op Diagnosis Codes:     * Right inguinal hernia [K40 90]    Procedure(s) (LRB):  REPAIR HERNIA INGUINAL WITH MESH (Right)    Specimen(s):  * No specimens in log *    Estimated Blood Loss:   Minimal    Drains:  * No LDAs found *    Anesthesia Type:   General    Operative Indications:  Right inguinal hernia [K40 90]      Operative Findings:  1  Cord lipoma  2  Direct inguinal hernia  Complications:   None    Procedure and Technique:  1  Excision of cord lipoma  2  Right inguinal hernia repair with mesh  Patient was taken back to main operating room, placed supine on the operating table, general anesthesia was induced, and the abdomen was prepped and draped in normal fashion  Utilizing an incision superior to the medial aspect of the inguinal ligament, the skin was incised  Soft tissue was divided with Bovie cautery  The external oblique aponeurosis was opened in the direction of its fibers  The ilioinguinal nerve was sacrificed  A Penrose drain was placed around all cord contents  There was a moderate size direct inguinal hernia  The cremaster muscles were opened in the direction of its fibers and a small cord lipoma was excised  There was no indirect sac  The inguinal canal floor was reconstructed with Prolene mesh  This was sewn in a continuous fashion to the shelving edge of the inguinal ligament as well as to the conjoined tendon  Tails of the mesh were wrapped around the spermatic cord to fashion a new deep ring  The was copiously irrigated  Spermatic cord was returned to its anatomical position in the inguinal canal   The external oblique aponeurosis was closed with 2 0 Vicryl    The wound was again irrigated and the subcutaneous tissue was approximated with 3 0 Vicryl as well as Josselyn's fascia  The dermis was approximated with 3 0 Vicryl and a 4 0 Monocryl was used to approximate the skin edges  Exofin was used as a dressing  The patient awoke from general anesthesia, was extubated in the operating room, and sent to the PACU in stable condition      JILL Mccullough was required for technical assistance and retraction       I was present for the entire procedure and A qualified resident physician was not available    Patient Disposition:  PACU     SIGNATURE: Gris Martínez MD  DATE: January 31, 2020  TIME: 11:50 AM

## 2020-01-31 NOTE — INTERVAL H&P NOTE
H&P reviewed  After examining the patient I find no changes in the patients condition since the H&P had been written      Vitals:    01/31/20 0917   BP: 148/92   Pulse: 84   Resp: 18   Temp: 98 1 °F (36 7 °C)   SpO2: 97%

## 2020-01-31 NOTE — DISCHARGE INSTR - AVS FIRST PAGE
1  Take Ibuprofen, 600 mg, 4 times a day regularly  2  Take the Percocet in addition to the Ibuprofen if you need further pain control  3  Keep incision clean and dry for 2 days  After 2 days, it may get wet in the shower  No dressing is required  4  Call my office, [phone], for an appointment to be seen in about 10-14 days

## 2020-02-02 NOTE — ANESTHESIA POSTPROCEDURE EVALUATION
Post-Op Assessment Note    CV Status:  Stable  Pain Score: 0    Pain management: adequate     Mental Status:  Awake   Hydration Status:  Stable   PONV Controlled:  None   Airway Patency:  Patent   Post Op Vitals Reviewed: Yes      Staff: Anesthesiologist           BP      Temp      Pulse     Resp      SpO2

## 2020-02-13 ENCOUNTER — OFFICE VISIT (OUTPATIENT)
Dept: SURGERY | Facility: CLINIC | Age: 35
End: 2020-02-13

## 2020-02-13 VITALS
HEART RATE: 106 BPM | SYSTOLIC BLOOD PRESSURE: 134 MMHG | BODY MASS INDEX: 40.66 KG/M2 | HEIGHT: 68 IN | TEMPERATURE: 99.6 F | DIASTOLIC BLOOD PRESSURE: 76 MMHG | WEIGHT: 268.3 LBS

## 2020-02-13 DIAGNOSIS — Z09 POSTOPERATIVE EXAMINATION: Primary | ICD-10-CM

## 2020-02-13 PROBLEM — K40.90 RIGHT INGUINAL HERNIA: Status: RESOLVED | Noted: 2020-01-28 | Resolved: 2020-02-13

## 2020-02-13 PROCEDURE — 99024 POSTOP FOLLOW-UP VISIT: CPT | Performed by: SURGERY

## 2020-02-13 RX ORDER — IBUPROFEN 200 MG
TABLET ORAL EVERY 6 HOURS PRN
COMMUNITY

## 2020-02-13 NOTE — LETTER
February 13, 2020     Patient: Daisy Zamudio   YOB: 1985   Date of Visit: 2/13/2020       To Whom it May Concern:    Daisy Zamudio is under my professional care  He was seen in my office on 2/13/2020  He may return to work on 14 February 2020  No lifting greater than 20 lb or strenuous exercise until 28 February 2020  If you have any questions or concerns, please don't hesitate to call           Sincerely,          Kezia Frank MD        CC: No Recipients

## 2020-02-13 NOTE — PROGRESS NOTES
Patient is here for follow-up status post excision of right cord lipoma and direct inguinal hernia repair with mesh performed on 31 January 2020  Patient reports minimal pain at incision and no wound problems  Incision healing well  Work note given that he may return to work tomorrow  No lifting greater than 20 lb or strenuous exercise for until 28 February  Follow-up p r n

## 2020-02-14 ENCOUNTER — TELEPHONE (OUTPATIENT)
Dept: SURGERY | Facility: CLINIC | Age: 35
End: 2020-02-14

## 2020-02-14 NOTE — TELEPHONE ENCOUNTER
Tiny Terry  at [phone], called looking for office notes and return to work note from 02/13 visit   All faxed to [phone]

## 2020-02-17 ENCOUNTER — TELEPHONE (OUTPATIENT)
Dept: PAIN MEDICINE | Facility: CLINIC | Age: 35
End: 2020-02-17

## 2020-02-18 NOTE — TELEPHONE ENCOUNTER
Spoke to Nadir Adler and told her I am still awaiting authorization  According to [facility] this was not work related  She said she will have Mercedes call me today

## 2020-02-20 PROBLEM — M54.50 LOW BACK PAIN: Status: ACTIVE | Noted: 2020-02-20

## 2020-02-20 PROBLEM — M51.16 LUMBAR DISC PROLAPSE WITH COMPRESSION RADICULOPATHY: Status: ACTIVE | Noted: 2020-02-20

## 2020-02-20 NOTE — TELEPHONE ENCOUNTER
Pt has been scheduled for Rt L3 L4 Tfesi for 02/28/20  Went over pre-procedure instructions below:  Pt denies blood thinners  Nothing to eat or drink 1 hr prior to procedure  Need to arrange transportation  Proper clothing for procedure  If ill or placed on antibiotics please call to reschedule

## 2020-02-28 ENCOUNTER — HOSPITAL ENCOUNTER (OUTPATIENT)
Facility: AMBULARY SURGERY CENTER | Age: 35
Setting detail: OUTPATIENT SURGERY
Discharge: HOME | End: 2020-02-28
Attending: ANESTHESIOLOGY | Admitting: ANESTHESIOLOGY
Payer: OTHER MISCELLANEOUS

## 2020-02-28 ENCOUNTER — APPOINTMENT (OUTPATIENT)
Dept: RADIOLOGY | Facility: HOSPITAL | Age: 35
End: 2020-02-28
Payer: OTHER MISCELLANEOUS

## 2020-02-28 VITALS
DIASTOLIC BLOOD PRESSURE: 89 MMHG | WEIGHT: 260.88 LBS | SYSTOLIC BLOOD PRESSURE: 158 MMHG | BODY MASS INDEX: 39.54 KG/M2 | HEART RATE: 86 BPM | RESPIRATION RATE: 16 BRPM | TEMPERATURE: 98.2 F | HEIGHT: 68 IN | OXYGEN SATURATION: 99 %

## 2020-02-28 PROCEDURE — 64484 NJX AA&/STRD TFRM EPI L/S EA: CPT | Performed by: ANESTHESIOLOGY

## 2020-02-28 PROCEDURE — 64483 NJX AA&/STRD TFRM EPI L/S 1: CPT | Performed by: ANESTHESIOLOGY

## 2020-02-28 PROCEDURE — 72020 X-RAY EXAM OF SPINE 1 VIEW: CPT

## 2020-02-28 RX ORDER — METHYLPREDNISOLONE ACETATE 80 MG/ML
INJECTION, SUSPENSION INTRA-ARTICULAR; INTRALESIONAL; INTRAMUSCULAR; SOFT TISSUE AS NEEDED
Status: DISCONTINUED | OUTPATIENT
Start: 2020-02-28 | End: 2020-02-28 | Stop reason: HOSPADM

## 2020-02-28 RX ORDER — LIDOCAINE WITH 8.4% SOD BICARB 0.9%(10ML)
SYRINGE (ML) INJECTION AS NEEDED
Status: DISCONTINUED | OUTPATIENT
Start: 2020-02-28 | End: 2020-02-28 | Stop reason: HOSPADM

## 2020-02-28 RX ORDER — BUPIVACAINE HYDROCHLORIDE 2.5 MG/ML
INJECTION, SOLUTION EPIDURAL; INFILTRATION; INTRACAUDAL AS NEEDED
Status: DISCONTINUED | OUTPATIENT
Start: 2020-02-28 | End: 2020-02-28 | Stop reason: HOSPADM

## 2020-02-28 NOTE — OP NOTE
ATTENDING PHYSICIAN:  Princess Doc MD     PROCEDURE:  1  Right L3 transforaminal epidural steroid injection under fluoroscopic guidance  2  Right L4 transforaminal epidural steroid injection under fluoroscopic guidance  PRE-PROCEDURE DIAGNOSIS:  Low back pain and right lower extremity radiculopathy  POST-PROCEDURE DIAGNOSIS:  Low back pain and right lower extremity radiculopathy  ANESTHESIA:  Local     ESTIMATED BLOOD LOSS:  Minimal     COMPLICATIONS:  None  LOCATION:  [facility]  CONSENT:  Today's procedure, its potential benefits as well as its risks and potential side effects were reviewed  Discussed risks of the procedure including bleeding, infection, nerve irritation or damage, reactions to the medications, weakness, headache, failure of the pain to improve, and potential worsening of the pain were explained to the patient who verbalized understanding and who wished to proceed  Written informed consent was thereby obtained  DESCRIPTION OF THE PROCEDURE:  After written informed consent was obtained, the patient was taken to the fluoroscopy suite and placed in the prone position  Anatomical landmarks were identified by way of fluoroscopy in multiple views  The skin of the lumbar region was prepped using antiseptic and draped in the usual sterile fashion  Strict aseptic technique was utilized  The skin and subcutaneous tissues at the needle entry site were infiltrated with a total of 5 mL of 1% preservative-free lidocaine using a 25-gauge 1-1/2-inch needle  22-gauge needles were then incrementally advanced under fluoroscopic guidance in the oblique view into the neural foramina as mentioned above  Proper placement into each of the neural foramen was confirmed with fluoroscopy in both the lateral and AP views   After negative aspiration for CSF or heme, contrast was injected, which delineated the nerve roots and the epidural space under fluoroscopy in the AP view  There was only a transient pressure paresthesia that resolved immediately upon injection  After negative aspiration, a 2 mL of a 4 mL injectate consisting of 3 mL of preservative-free 0 25% bupivacaine and 1 mL of Depo-Medrol 80 mg/mL was slowly injected into each of the needles as delineated above  The patient tolerated the procedure well and all needles were removed intact  Hemostasis was maintained  There were no apparent paresthesias or complications  The skin was wiped clean and a Band-Aid was placed as appropriate  The patient was monitored for an appropriate period of time and remained hemodynamically stable following the procedure  The patient was ultimately discharged to home with supervision in good condition and instructed to call the office in approximately 7-10 days with an update or sooner as warranted  Discharge instructions were provided  I was present for and participated in all key and critical portions of this procedure      Enedina Deras MD  2/28/2020  10:32 AM

## 2020-02-28 NOTE — DISCHARGE INSTRUCTIONS
Epidural Steroid Injection   WHAT YOU NEED TO KNOW:   An epidural steroid injection (NGUYEN) is a procedure to inject steroid medicine into the epidural space  The epidural space is between your spinal cord and vertebrae  Steroids reduce inflammation and fluid buildup in your spine that may be causing pain  You may be given pain medicine along with the steroids  ACTIVITY  · Do not drive or operate machinery today  · No strenuous activity today - bending, lifting, etc   · You may resume normal activites starting tomorrow - start slowly and as tolerated  · You may shower today, but no tub baths or hot tubs  · You may have numbness for several hours from the local anesthetic  Please use caution and common sense, especially with weight-bearing activities  CARE OF THE INJECTION SITE  · If you have soreness or pain, apply ice to the area today (20 minutes on/20 minutes off)  · Starting tomorrow, you may use warm, moist heat or ice if needed  · You may have an increase or change in your discomfort for 36-48 hours after your treatment  · Apply ice and continue with any pain medication you have been prescribed  · Notify [facility] if you have any of the following: redness, drainage, swelling, headache, stiff neck or fever above 100°F     SPECIAL INSTRUCTIONS  · Our office will contact you in approximately 7 days for a progress report  MEDICATIONS  · Continue to take all routine medications  · Our office may have instructed you to hold some medications  If you have a problem specifically related to your procedure, please call our office at [phone]  Problems not related to your procedure should be directed to your primary care physician

## 2020-02-28 NOTE — H&P
History of Present Illness: The patient is a 37 y o  male who presents with complaints of low back pain and right lower extremity radiculopathy  Patient Active Problem List   Diagnosis    Acute right-sided low back pain with right-sided sciatica    Lumbar disc herniation with radiculopathy    Lumbar disc prolapse with compression radiculopathy    Low back pain       Past Medical History:   Diagnosis Date    Back pain     L3-4  herniated disc    Mild acid reflux     diet controlled    Sleep apnea     Hx 2014 post deviated septum repair       Past Surgical History:   Procedure Laterality Date    EYE SURGERY Left     metal in eye    HERNIA REPAIR      KNEE BIOPSY      NOSE SURGERY      rhinoplasty, septoplasty    SC REPAIR ING HERNIA,5+Y/O,REDUCIBL Right 1/31/2020    Procedure: REPAIR HERNIA INGUINAL WITH MESH;  Surgeon: Gabbie Royal MD;  Location: [address] [place] [address];  Service: General    TENDON RELEASE Right 5/1/2017    Procedure: RIGHT ELBOW EXTENSOR TENDON REPAIR;  Surgeon: Riley Villanueva MD;  Location: [facility] MAIN OR;  Service:    Jeni Tyler TYMPANOSTOMY TUBE PLACEMENT Bilateral        No current facility-administered medications for this encounter       No Known Allergies    Physical Exam:   Vitals:    02/28/20 0949   BP: 150/87   Pulse: 87   Resp: 16   Temp: 98 2 °F (36 8 °C)   SpO2: 96%     General: Awake, Alert, Oriented x 3, Mood and affect appropriate  Respiratory: Respirations even and unlabored  Cardiovascular: Peripheral pulses intact; no edema  Musculoskeletal Exam:  Tenderness in lumbar spine region    ASA Score: 3    Patient/Chart Verification  Patient ID Verified: Verbal, Armband  ID Band Applied: Yes  H&P( within 30 days) Verified: Yes  Beta Blocker given : N/A  Pre-op Lab/Test Results Available: N/A  Does Patient Have a Prosthetic Device/Implant: No  Advance Directive: Patient does not have advance directive - would not like information    Assessment:  Low back pain and right lower extremity radiculopathy      Plan:  Proceed with L3 and L4 transforaminal epidural steroid injection on the right

## 2020-03-02 ENCOUNTER — TELEPHONE (OUTPATIENT)
Dept: OBGYN CLINIC | Facility: HOSPITAL | Age: 35
End: 2020-03-02

## 2020-03-02 NOTE — TELEPHONE ENCOUNTER
Olga Lidia Barrera  at [phone] is calling to schedule the patient with Dr Yakelin Catherine following the injection from Dr Latha Madrid  Patient is scheduled for 3/16/20 per Dustin Everett  I did advise Dustin Everett that typically spa follows patient with calls to check progress for 2-4 weeks after the injection  Dustin Everett would like the patient seen on 3/16/20 so he can be cleared to go back to work

## 2020-03-06 ENCOUNTER — TELEPHONE (OUTPATIENT)
Dept: PAIN MEDICINE | Facility: CLINIC | Age: 35
End: 2020-03-06

## 2020-03-16 ENCOUNTER — OFFICE VISIT (OUTPATIENT)
Dept: OBGYN CLINIC | Facility: CLINIC | Age: 35
End: 2020-03-16
Payer: OTHER MISCELLANEOUS

## 2020-03-16 VITALS
SYSTOLIC BLOOD PRESSURE: 126 MMHG | DIASTOLIC BLOOD PRESSURE: 84 MMHG | HEIGHT: 68 IN | BODY MASS INDEX: 39.4 KG/M2 | HEART RATE: 76 BPM | WEIGHT: 260 LBS

## 2020-03-16 DIAGNOSIS — R20.0 NUMBNESS AND TINGLING OF RIGHT LOWER EXTREMITY: ICD-10-CM

## 2020-03-16 DIAGNOSIS — K40.90 INGUINAL HERNIA OF RIGHT SIDE WITHOUT OBSTRUCTION OR GANGRENE: ICD-10-CM

## 2020-03-16 DIAGNOSIS — R20.2 NUMBNESS AND TINGLING OF RIGHT LOWER EXTREMITY: ICD-10-CM

## 2020-03-16 DIAGNOSIS — M54.16 RADICULOPATHY, LUMBAR REGION: Primary | ICD-10-CM

## 2020-03-16 DIAGNOSIS — M51.16 LUMBAR DISC HERNIATION WITH RADICULOPATHY: ICD-10-CM

## 2020-03-16 PROCEDURE — 99214 OFFICE O/P EST MOD 30 MIN: CPT | Performed by: ORTHOPAEDIC SURGERY

## 2020-03-16 NOTE — PROGRESS NOTES
Assessment/Plan:  1  Radiculopathy, lumbar region  Ambulatory referral to Orthopedic Surgery   2  Numbness and tingling of right lower extremity  Ambulatory referral to Orthopedic Surgery   3  Lumbar disc herniation with radiculopathy  Ambulatory referral to Orthopedic Surgery   4  Inguinal hernia of right side without obstruction or gangrene  Ambulatory referral to General Surgery       Scribe Attestation    I,:   Placido Azevedo am acting as a scribe while in the presence of the attending physician :        I,:   Vanessa Lozano MD personally performed the services described in this documentation    as scribed in my presence :              Marifer Valladares upon examination today does continue to demonstrate symptoms consistent with lumbar radiculopathy  He does have a known L3, and L4 disc herniations as evident on previous MRI  I did view that MRI today  He demonstrates global diffuse numbness into the right lower extremity that is not specific to L3, and L4  I do have some concern for Marifer Valladares as he does demonstrate weakness as well on clinical exam   With this in mind I would like to refer Marifer Valladares to our spine surgeon Dr Newton Palencia for his expertise in this field  I did remark to him that his subsequent follow-up visits will be with him, as well as any clearance notes for work  In regards to the right inguinal hernia Marifer Valladares does continue to demonstrate painful symptoms, and persistent numbness and tingling into the groin region  I did provide him a referral to Dr Colton Hart for a follow-up evaluation  Marifer Valladares also receive clearance from Dr Colton Hart in regards to the hernia for any work related issues  Marifer Valladares may follow up with me on an as-needed basis  Subjective:   Doris Colon is a 37 y o  male who presents to the office today for follow-up evaluation of his right hip and low back pain  This is a workman's compensation case    Demarcotahir Valladares notes that he unfortunately has had little improvement with his low back pain, and numbness into his right leg  He did undergo a hernial repair under Dr Charles Soto on 1/31/2020  He notes that bending and kneeling has improved however is still experiencing pain into inguinal fold area he also has complaints of paresthesias into his groin area and notes that he is having difficulty with intercourse due to the paresthesias  He states that he did undergo a transforaminal stenosis injection from Dr Jessica Mcfarland for the L3, and L4 herniation on 2/20/2020  He states that he did have a few days of relief however his pain, and numbness did return as affecting his right lower leg strength, and sensation  He has been on driving restriction as he has continue to demonstrate weakness and paresthesias into the right lower extremity  He has been out of work as a  up until this point  Review of Systems   Constitutional: Positive for unexpected weight change  Negative for chills and fever  HENT: Negative for hearing loss, nosebleeds and sore throat  Eyes: Negative for pain, redness and visual disturbance  Respiratory: Negative for cough, shortness of breath and wheezing  Cardiovascular: Negative for chest pain, palpitations and leg swelling  Gastrointestinal: Negative for abdominal pain, nausea and vomiting  Endocrine: Negative for polyphagia and polyuria  Genitourinary: Negative for dysuria and hematuria  Musculoskeletal: Positive for arthralgias and myalgias  See HPI   Skin: Negative for rash and wound  Neurological: Positive for numbness (Right leg)  Negative for dizziness and headaches  Psychiatric/Behavioral: Negative for decreased concentration and suicidal ideas  The patient is not nervous/anxious            Past Medical History:   Diagnosis Date    Back pain     L3-4  herniated disc    Mild acid reflux     diet controlled    Sleep apnea     Hx 2014 post deviated septum repair       Past Surgical History:   Procedure Laterality Date    EPIDURAL BLOCK INJECTION Right 2020    Procedure: L3 L4 Transforaminal Epidural Steroid Injection ([id number]);   Surgeon: Candace Augustin MD;  Location: [facility] MAIN OR;  Service: Pain Management     EYE SURGERY Left     metal in eye    HERNIA REPAIR      KNEE BIOPSY      NOSE SURGERY      rhinoplasty, septoplasty    IA REPAIR ING HERNIA,5+Y/O,REDUCIBL Right 2020    Procedure: REPAIR HERNIA INGUINAL WITH MESH;  Surgeon: Yoon Mcfarlane MD;  Location: [facility] MAIN OR;  Service: General    TENDON RELEASE Right 2017    Procedure: RIGHT ELBOW EXTENSOR TENDON REPAIR;  Surgeon: Annelise Doran MD;  Location: [facility] MAIN OR;  Service:    Junius Peres TYMPANOSTOMY TUBE PLACEMENT Bilateral        Family History   Problem Relation Age of Onset    Hypertension Mother     Asthma Sister     Chiari malformation Sister    Junius Peres Migraines Sister     Hypertension Maternal Grandfather     ADD / ADHD Brother     Cancer Brother         oral chews tobacco    No Known Problems Son        Social History     Occupational History    Not on file   Tobacco Use    Smoking status: Former Smoker     Packs/day: 0 75     Years: 15 00     Pack years: 11 25     Last attempt to quit:      Years since quittin 2    Smokeless tobacco: Never Used   Substance and Sexual Activity    Alcohol use: Yes     Frequency: 2-4 times a month     Comment: socially    Drug use: No    Sexual activity: Not on file         Current Outpatient Medications:     ibuprofen (MOTRIN) 200 mg tablet, Take by mouth every 6 (six) hours as needed for mild pain, Disp: , Rfl:     oxyCODONE-acetaminophen (PERCOCET) 5-325 mg per tablet, Take 1 tablet by mouth every 4 (four) hours as needed for moderate pain or severe pain for up to 12 dosesMax Daily Amount: 6 tablets (Patient not taking: Reported on 2020), Disp: 12 tablet, Rfl: 0    No Known Allergies    Objective:  Vitals:    20 0837   BP: 126/84   Pulse: 76       Right Hip Exam     Tenderness   The patient is experiencing tenderness in the anterior  Muscle Strength   Right hip normal muscle strength: EHL, ant Tib: 4/5, Gastrocnemius: 4/5  Flexion: 4/5     Other   Erythema: absent  Scars: present  Sensation: decreased (Global decreased sensation into the right lower extremity)  Pulse: present    Comments:  Sandra's sign: negative            Physical Exam   Constitutional: He is oriented to person, place, and time  He appears well-developed and well-nourished  HENT:   Head: Normocephalic and atraumatic  Eyes: Conjunctivae are normal  Right eye exhibits no discharge  Left eye exhibits no discharge  Neck: Normal range of motion  Neck supple  Cardiovascular: Normal rate and intact distal pulses  Pulmonary/Chest: Effort normal  No respiratory distress  Musculoskeletal:   As noted in HPI   Neurological: He is alert and oriented to person, place, and time  Skin: Skin is warm and dry  Psychiatric: He has a normal mood and affect  His behavior is normal  Judgment and thought content normal    Vitals reviewed  I did view the MRI of the lumbar spine from January of 2020 demonstrating L3 and L4 disc herniations impinging upon the exiting nerve roots

## 2020-03-16 NOTE — LETTER
March 16, 2020     Patient: Sd Swan   YOB: 1985   Date of Visit: 3/16/2020       To Whom it May Concern:    Sd Swan is under my professional care  He was seen in my office on 3/16/2020  He is out of work until further notice  If you have any questions or concerns, please don't hesitate to call           Sincerely,          Asiya Mcgrath MD        CC: No Recipients

## 2020-03-26 ENCOUNTER — OFFICE VISIT (OUTPATIENT)
Dept: SURGERY | Facility: CLINIC | Age: 35
End: 2020-03-26

## 2020-03-26 VITALS
DIASTOLIC BLOOD PRESSURE: 78 MMHG | SYSTOLIC BLOOD PRESSURE: 122 MMHG | TEMPERATURE: 99.2 F | HEIGHT: 68 IN | BODY MASS INDEX: 40.32 KG/M2 | WEIGHT: 266 LBS | HEART RATE: 81 BPM

## 2020-03-26 DIAGNOSIS — R10.31 GROIN DISCOMFORT, RIGHT: ICD-10-CM

## 2020-03-26 PROCEDURE — 99213 OFFICE O/P EST LOW 20 MIN: CPT | Performed by: SURGERY

## 2020-03-26 NOTE — PROGRESS NOTES
[facility] History and Physical Note:    Assessment:  Right groin discomfort  Area of numbness is in the ileoinguinal nerve distribution and was explained to the patient before as being normal   The size of numbness will slowly decrease in surface area but a small area will be permanent  Plan:  No complications status post right inguinal hernia repair with mesh  Patient counseled  Chief Complaint:  I am here for the numbness and discomfort    HPI  Patient is status post right inguinal hernia repair with mesh 31 January 2020  Patient has a long history of right groin discomfort as well as back and left leg pain and neurological complaints  His right inguinal hernia repair went well and he recovered nicely  He returns now stating he has numbness in the groin area and has a pulling sensation in the groin area with certain movements  He also reports some discomfort with an erection  His chronic low back and right lower extremity neurologic complaints are largely unchanged    PMH:  Past Medical History:   Diagnosis Date    Back pain     L3-4  herniated disc    Mild acid reflux     diet controlled    Sleep apnea     Hx 2014 post deviated septum repair       PSH:  Past Surgical History:   Procedure Laterality Date    EPIDURAL BLOCK INJECTION Right 2/28/2020    Procedure: L3 L4 Transforaminal Epidural Steroid Injection ([id number]);   Surgeon: Jie Tellez MD;  Location: [facility];  Service: Pain Management     EYE SURGERY Left     metal in eye    HERNIA REPAIR      KNEE BIOPSY      NOSE SURGERY      rhinoplasty, septoplasty    DE REPAIR ING HERNIA,5+Y/O,REDUCIBL Right 1/31/2020    Procedure: REPAIR HERNIA INGUINAL WITH MESH;  Surgeon: Nara Bahena MD;  Location: [address] [place] [address];  Service: General    TENDON RELEASE Right 5/1/2017    Procedure: RIGHT ELBOW EXTENSOR TENDON REPAIR;  Surgeon: Baljinder Mcfarland MD;  Location: [facility] OR;  Service:    Acacia Lind TYMPANOSTOMY TUBE PLACEMENT Bilateral        Home Meds:  Current Outpatient Medications on File Prior to Visit   Medication Sig Dispense Refill    ibuprofen (MOTRIN) 200 mg tablet Take by mouth every 6 (six) hours as needed for mild pain      oxyCODONE-acetaminophen (PERCOCET) 5-325 mg per tablet Take 1 tablet by mouth every 4 (four) hours as needed for moderate pain or severe pain for up to 12 dosesMax Daily Amount: 6 tablets (Patient not taking: Reported on 2020) 12 tablet 0     No current facility-administered medications on file prior to visit          Allergies:  No Known Allergies    Social Hx:  Social History     Socioeconomic History    Marital status: Single     Spouse name: Not on file    Number of children: Not on file    Years of education: Not on file    Highest education level: Not on file   Occupational History    Not on file   Social Needs    Financial resource strain: Not on file    Food insecurity:     Worry: Not on file     Inability: Not on file    Transportation needs:     Medical: Not on file     Non-medical: Not on file   Tobacco Use    Smoking status: Former Smoker     Packs/day: 0 75     Years: 15 00     Pack years: 11 25     Last attempt to quit:      Years since quittin 2    Smokeless tobacco: Never Used   Substance and Sexual Activity    Alcohol use: Yes     Frequency: 2-4 times a month     Comment: socially    Drug use: No    Sexual activity: Not on file   Lifestyle    Physical activity:     Days per week: Not on file     Minutes per session: Not on file    Stress: Not on file   Relationships    Social connections:     Talks on phone: Not on file     Gets together: Not on file     Attends Congregation service: Not on file     Active member of club or organization: Not on file     Attends meetings of clubs or organizations: Not on file     Relationship status: Not on file    Intimate partner violence:     Fear of current or ex partner: Not on file     Emotionally abused: Not on file     Physically abused: Not on file     Forced sexual activity: Not on file   Other Topics Concern    Not on file   Social History Narrative    Not on file        Family Hx:    Family History   Problem Relation Age of Onset    Hypertension Mother     Asthma Sister     Chiari malformation Sister    Timo Flower Migraines Sister     Hypertension Maternal Grandfather     ADD / ADHD Brother     Cancer Brother         oral chews tobacco    No Known Problems Son          Review of Systems   Constitutional: Negative  Respiratory: Negative  Gastrointestinal: Negative  Genitourinary: Negative  Musculoskeletal:        See past medical history and recent notes by orthopedic surgery   Neurological:        Chronic back pain and right lower extremity weakness report   Hematological: Negative  Psychiatric/Behavioral: Negative  There were no vitals taken for this visit  Physical Exam   Constitutional: He is oriented to person, place, and time  He appears well-developed  No distress  Obese   HENT:   Head: Normocephalic  Eyes: Pupils are equal, round, and reactive to light  Cardiovascular: Normal rate  Pulmonary/Chest: Effort normal  No respiratory distress  Abdominal:   Soft, nondistended, nontender  Right groin incision well healed  Area of numbness is in the ileal inguinal distribution and was explained to the patient before as being normal   The size of numbness will slowly decrease in surface area but a small area will be permanent   Neurological: He is alert and oriented to person, place, and time  Skin: Skin is warm and dry  Psychiatric: He has a normal mood and affect         Pertinent labs reviewed    Pertinent images and available reads personally reviewed    Pertinent notes reviewed       Jennifer Stock MD [id number] [facility]  [phone]

## 2020-04-06 ENCOUNTER — OFFICE VISIT (OUTPATIENT)
Dept: OBGYN CLINIC | Facility: HOSPITAL | Age: 35
End: 2020-04-06
Payer: OTHER MISCELLANEOUS

## 2020-04-06 VITALS
HEIGHT: 67 IN | HEART RATE: 85 BPM | DIASTOLIC BLOOD PRESSURE: 87 MMHG | WEIGHT: 267 LBS | BODY MASS INDEX: 41.91 KG/M2 | SYSTOLIC BLOOD PRESSURE: 144 MMHG

## 2020-04-06 DIAGNOSIS — M46.1 SACROILIITIS (HCC): Primary | ICD-10-CM

## 2020-04-06 DIAGNOSIS — M51.16 LUMBAR DISC HERNIATION WITH RADICULOPATHY: ICD-10-CM

## 2020-04-06 DIAGNOSIS — M53.3 PAIN OF RIGHT SACROILIAC JOINT: ICD-10-CM

## 2020-04-06 PROCEDURE — 99214 OFFICE O/P EST MOD 30 MIN: CPT | Performed by: ORTHOPAEDIC SURGERY

## 2020-05-15 ENCOUNTER — TELEPHONE (OUTPATIENT)
Dept: OBGYN CLINIC | Facility: CLINIC | Age: 35
End: 2020-05-15

## 2020-05-19 ENCOUNTER — OFFICE VISIT (OUTPATIENT)
Dept: PAIN MEDICINE | Facility: CLINIC | Age: 35
End: 2020-05-19
Payer: OTHER MISCELLANEOUS

## 2020-05-19 VITALS
HEART RATE: 81 BPM | HEIGHT: 67 IN | WEIGHT: 267 LBS | DIASTOLIC BLOOD PRESSURE: 83 MMHG | SYSTOLIC BLOOD PRESSURE: 135 MMHG | BODY MASS INDEX: 41.91 KG/M2 | TEMPERATURE: 98.9 F

## 2020-05-19 DIAGNOSIS — M54.41 CHRONIC RIGHT-SIDED LOW BACK PAIN WITH RIGHT-SIDED SCIATICA: ICD-10-CM

## 2020-05-19 DIAGNOSIS — G89.29 CHRONIC RIGHT-SIDED LOW BACK PAIN WITH RIGHT-SIDED SCIATICA: ICD-10-CM

## 2020-05-19 DIAGNOSIS — G89.4 CHRONIC PAIN SYNDROME: Primary | ICD-10-CM

## 2020-05-19 DIAGNOSIS — M46.1 SACROILIITIS (HCC): ICD-10-CM

## 2020-05-19 PROCEDURE — 99214 OFFICE O/P EST MOD 30 MIN: CPT | Performed by: ANESTHESIOLOGY

## 2020-06-01 ENCOUNTER — TELEPHONE (OUTPATIENT)
Dept: PAIN MEDICINE | Facility: CLINIC | Age: 35
End: 2020-06-01

## 2020-06-01 DIAGNOSIS — Z01.818 PREOPERATIVE CLEARANCE: Primary | ICD-10-CM

## 2020-06-01 NOTE — TELEPHONE ENCOUNTER
Scheduled pt for Rt SIJ for 6/12/20  Went over pre-procedure instructions below:  Nothing to eat or drink 1 hr prior to procedure  Need to arrange transportation  Proper clothing for procedure  If ill or placed on antibiotics please call to reschedule  Pt instructed to go for Covid test on 6/8/20 at [facility] on [address] before noon   Covid Disclaimer complete    Please order Covid Test

## 2020-06-08 DIAGNOSIS — Z01.818 PREOPERATIVE CLEARANCE: ICD-10-CM

## 2020-06-08 PROCEDURE — U0003 INFECTIOUS AGENT DETECTION BY NUCLEIC ACID (DNA OR RNA); SEVERE ACUTE RESPIRATORY SYNDROME CORONAVIRUS 2 (SARS-COV-2) (CORONAVIRUS DISEASE [COVID-19]), AMPLIFIED PROBE TECHNIQUE, MAKING USE OF HIGH THROUGHPUT TECHNOLOGIES AS DESCRIBED BY CMS-2020-01-R: HCPCS

## 2020-06-09 LAB — SARS-COV-2 RNA SPEC QL NAA+PROBE: NOT DETECTED

## 2020-06-12 ENCOUNTER — APPOINTMENT (OUTPATIENT)
Dept: RADIOLOGY | Facility: HOSPITAL | Age: 35
End: 2020-06-12
Payer: OTHER MISCELLANEOUS

## 2020-06-12 ENCOUNTER — HOSPITAL ENCOUNTER (OUTPATIENT)
Facility: AMBULARY SURGERY CENTER | Age: 35
Setting detail: OUTPATIENT SURGERY
Discharge: HOME | End: 2020-06-12
Attending: ANESTHESIOLOGY | Admitting: ANESTHESIOLOGY
Payer: OTHER MISCELLANEOUS

## 2020-06-12 VITALS
TEMPERATURE: 98.3 F | SYSTOLIC BLOOD PRESSURE: 145 MMHG | RESPIRATION RATE: 18 BRPM | HEART RATE: 84 BPM | DIASTOLIC BLOOD PRESSURE: 87 MMHG | OXYGEN SATURATION: 99 %

## 2020-06-12 PROCEDURE — 27096 INJECT SACROILIAC JOINT: CPT | Performed by: ANESTHESIOLOGY

## 2020-06-12 RX ORDER — LIDOCAINE WITH 8.4% SOD BICARB 0.9%(10ML)
SYRINGE (ML) INJECTION AS NEEDED
Status: DISCONTINUED | OUTPATIENT
Start: 2020-06-12 | End: 2020-06-12 | Stop reason: HOSPADM

## 2020-06-12 RX ORDER — METHYLPREDNISOLONE ACETATE 80 MG/ML
INJECTION, SUSPENSION INTRA-ARTICULAR; INTRALESIONAL; INTRAMUSCULAR; SOFT TISSUE AS NEEDED
Status: DISCONTINUED | OUTPATIENT
Start: 2020-06-12 | End: 2020-06-12 | Stop reason: HOSPADM

## 2020-06-12 RX ORDER — BUPIVACAINE HYDROCHLORIDE 2.5 MG/ML
INJECTION, SOLUTION EPIDURAL; INFILTRATION; INTRACAUDAL AS NEEDED
Status: DISCONTINUED | OUTPATIENT
Start: 2020-06-12 | End: 2020-06-12 | Stop reason: HOSPADM

## 2020-06-19 ENCOUNTER — TELEPHONE (OUTPATIENT)
Dept: PAIN MEDICINE | Facility: CLINIC | Age: 35
End: 2020-06-19

## 2020-06-22 NOTE — TELEPHONE ENCOUNTER
Pt reports 10-20% improvement post inj   Pain level 4-5/10  He said last week his pain was only about a 2/10 but yesterday he started to get more pain again  Aware takes up to 2wks for full effect

## 2020-06-29 ENCOUNTER — TELEPHONE (OUTPATIENT)
Dept: OBGYN CLINIC | Facility: MEDICAL CENTER | Age: 35
End: 2020-06-29

## 2020-06-29 ENCOUNTER — OFFICE VISIT (OUTPATIENT)
Dept: OBGYN CLINIC | Facility: HOSPITAL | Age: 35
End: 2020-06-29
Payer: OTHER MISCELLANEOUS

## 2020-06-29 VITALS
SYSTOLIC BLOOD PRESSURE: 132 MMHG | HEIGHT: 67 IN | BODY MASS INDEX: 41.75 KG/M2 | HEART RATE: 91 BPM | DIASTOLIC BLOOD PRESSURE: 88 MMHG | WEIGHT: 266 LBS

## 2020-06-29 DIAGNOSIS — M46.1 SACROILIITIS (HCC): Primary | ICD-10-CM

## 2020-06-29 DIAGNOSIS — M51.16 LUMBAR DISC HERNIATION WITH RADICULOPATHY: ICD-10-CM

## 2020-06-29 PROCEDURE — 99214 OFFICE O/P EST MOD 30 MIN: CPT | Performed by: ORTHOPAEDIC SURGERY

## 2020-07-01 ENCOUNTER — TELEPHONE (OUTPATIENT)
Dept: OBGYN CLINIC | Facility: HOSPITAL | Age: 35
End: 2020-07-01

## 2020-07-01 ENCOUNTER — TELEPHONE (OUTPATIENT)
Dept: PAIN MEDICINE | Facility: CLINIC | Age: 35
End: 2020-07-01

## 2020-07-01 NOTE — TELEPHONE ENCOUNTER
Sure  If he is still having 50% relief from the last injection, he does not need to come in right away

## 2020-07-01 NOTE — TELEPHONE ENCOUNTER
Please review Dr Ata Vaughn note, want to schedule pt for another appt to discuss repeat R SIJ vs MBB/RFA?

## 2020-07-01 NOTE — TELEPHONE ENCOUNTER
Pilar Salmon  - Nurse  is requesting summary notes from the 6/12 procedure to be faxed to:    Fax:  [phone]  Phone: [phone]      She was also informed that Dr Melanie Mendez is requesting another injection,   Please advise

## 2020-07-01 NOTE — TELEPHONE ENCOUNTER
Patient sees Dr Courtney Swanson the nurse  is calling in to get this patients updated work status  She is asking for a work note and for this to be faxed to her        Fax# [phone]

## 2020-07-01 NOTE — TELEPHONE ENCOUNTER
Chart reviewed - Dr Tiffani Singh did not go over work status with patient  From our standpoint he has no work restrictions      He would have to follow-up with previous provider who was providing work status for him  Thanks

## 2020-07-09 ENCOUNTER — OFFICE VISIT (OUTPATIENT)
Dept: PAIN MEDICINE | Facility: CLINIC | Age: 35
End: 2020-07-09
Payer: OTHER MISCELLANEOUS

## 2020-07-09 ENCOUNTER — TELEPHONE (OUTPATIENT)
Dept: PAIN MEDICINE | Facility: CLINIC | Age: 35
End: 2020-07-09

## 2020-07-09 VITALS
HEIGHT: 67 IN | DIASTOLIC BLOOD PRESSURE: 87 MMHG | SYSTOLIC BLOOD PRESSURE: 125 MMHG | HEART RATE: 78 BPM | WEIGHT: 266 LBS | BODY MASS INDEX: 41.75 KG/M2 | TEMPERATURE: 97 F

## 2020-07-09 DIAGNOSIS — M54.41 CHRONIC RIGHT-SIDED LOW BACK PAIN WITH RIGHT-SIDED SCIATICA: ICD-10-CM

## 2020-07-09 DIAGNOSIS — M46.1 SACROILIITIS (HCC): ICD-10-CM

## 2020-07-09 DIAGNOSIS — G89.29 CHRONIC RIGHT-SIDED LOW BACK PAIN WITH RIGHT-SIDED SCIATICA: ICD-10-CM

## 2020-07-09 DIAGNOSIS — G89.4 CHRONIC PAIN SYNDROME: Primary | ICD-10-CM

## 2020-07-09 DIAGNOSIS — M51.16 LUMBAR DISC PROLAPSE WITH COMPRESSION RADICULOPATHY: ICD-10-CM

## 2020-07-09 PROCEDURE — 99214 OFFICE O/P EST MOD 30 MIN: CPT | Performed by: ANESTHESIOLOGY

## 2020-07-09 NOTE — PROGRESS NOTES
Pain Medicine Follow-Up Note    Assessment:  1  Chronic pain syndrome    2  Chronic right-sided low back pain with right-sided sciatica    3  Sacroiliitis ([address] )    4  Lumbar disc prolapse with compression radiculopathy        Plan:  Orders Placed This Encounter   Procedures    Ambulatory referral to Neurosurgery     Standing Status:   Future     Standing Expiration Date:   7/9/2021     Referral Priority:   Routine     Referral Type:   Consult - AMB     Referral Reason:   Specialty Services Required     Referred to Provider:   Iwona Stark MD     Requested Specialty:   Neurosurgery     Number of Visits Requested:   1     Expiration Date:   7/9/2021     My impressions and treatment recommendations were discussed in detail with the patient who verbalized understanding and had no further questions  The patient reports that he is no longer interested in any interventional pain procedures  He states that while the right sacroiliac joint injection did give him pain relief for 1 weeks duration, he is very frustrated because all the injections are short lived  He was hoping when he last saw Dr Ar Schaefer that he would be moving forward with surgery, but he reports that Dr Ar Schaefer referred him for more interventional pain procedures  At this point, the patient is looking for a second opinion regarding his low back and would like to see Dr Angelique Ralph at [facility] for this  I provided him a referral for this at today's visit  Follow-up is planned on an as-needed basis  Discharge instructions were provided  I personally saw and examined the patient and I agree with the above discussed plan of care  History of Present Illness:    Yasmeen Childress is a 38 y o  male who presents to [facility] for interval re-evaluation of the above stated pain complaints  The patient has a past medical and chronic pain history as outlined in the assessment section   He was last seen on June 12, 2020 at which time he underwent a right sacroiliac joint injection  At today's office visit, the patient's pain score is 7/10 on the verbal numerical pain rating scale  The patient states that his pain is primarily in his right buttocks with radiation down his right lower extremity  He describes pain as worse in the morning, evening, and night  His pain is constant in nature and reports the quality of his pain as dull/aching, sharp, and numbness  The patient reports that he was referred by Dr Gene Arguello to our office for more interventional pain procedures  He is not interested in any further interventional pain procedures at this time  He was hopeful for surgery to help his overall pain, but at this point, he would like to undergo a second opinion  He is requesting a referral to Dr Andrew Carcamo at [facility]  Other than as stated above, the patient denies any interval changes in medications, medical condition, mental condition, symptoms, or allergies since the last office visit  Review of Systems:    Review of Systems   Respiratory: Negative for shortness of breath  Cardiovascular: Negative for chest pain  Gastrointestinal: Negative for constipation, diarrhea, nausea and vomiting  Musculoskeletal: Positive for back pain and gait problem  Negative for arthralgias, joint swelling and myalgias  Decreased ROM  Muscle weakness  Joint stiffness  Pain in right leg   Skin: Negative for rash  Neurological: Positive for numbness  Negative for dizziness, seizures and weakness  All other systems reviewed and are negative          Patient Active Problem List   Diagnosis    Lumbar disc herniation with radiculopathy    Lumbar disc prolapse with compression radiculopathy    Chronic right-sided low back pain with right-sided sciatica    Sacroiliitis (HCC)    Chronic pain syndrome       Past Medical History:   Diagnosis Date    Back pain     L3-4  herniated disc    Mild acid reflux diet controlled    Sleep apnea     Hx  post deviated septum repair       Past Surgical History:   Procedure Laterality Date    EPIDURAL BLOCK INJECTION Right 2020    Procedure: L3 L4 Transforaminal Epidural Steroid Injection ([id number]); Surgeon: Darlene Blanco MD;  Location: [facility] MA[facility] OR;  Service: Pain Management     EYE SURGERY Left     metal in eye    HERNIA REPAIR      KNEE BIOPSY      NOSE SURGERY      rhinoplasty, septoplasty    NH INJECTION,SACROILIAC JOINT Right 2020    Procedure: RIGHT SACROILIAC JOINT INJECTION ([id number]);   Surgeon: Darlene Blanco MD;  Location: [facility] MAIN OR;  Service: Pain Management     NH REPAIR ING HERNIA,5+Y/O,REDUCIBL Right 2020    Procedure: REPAIR HERNIA INGUINAL WITH MESH;  Surgeon: Batsheva Mata MD;  Location: [place] MAIN OR;  Service: General    TENDON RELEASE Right 2017    Procedure: RIGHT ELBOW EXTENSOR TENDON REPAIR;  Surgeon: Leeroy Shrestha MD;  Location: [facility] MA[facility] OR;  Service:    Liv Roldane TYMPANOSTOMY TUBE PLACEMENT Bilateral        Family History   Problem Relation Age of Onset    Hypertension Mother     Asthma Sister     Chiari malformation Sister    [place] Migraines Sister     Hypertension Maternal Grandfather     ADD / ADHD Brother     Cancer Brother         oral chews tobacco    No Known Problems Son        Social History     Occupational History    Not on file   Tobacco Use    Smoking status: Former Smoker     Packs/day: 0 75     Years: 15 00     Pack years: 11 25     Last attempt to quit:      Years since quittin 5    Smokeless tobacco: Never Used   Substance and Sexual Activity    Alcohol use: Yes     Frequency: 2-4 times a month     Comment: socially    Drug use: No    Sexual activity: Not on file         Current Outpatient Medications:     ibuprofen (MOTRIN) 200 mg tablet, Take by mouth every 6 (six) hours as needed for mild pain, Disp: , Rfl:     No Known Allergies    Physical Exam:    /87   Pulse 78 Temp (!) 97 °F (36 1 °C)   Ht 5' 7" (1 702 m)   Wt 121 kg (266 lb)   BMI 41 66 kg/m²     Constitutional:obese  Eyes:anicteric  HEENT:grossly intact  Neck:supple, symmetric, trachea midline and no masses   Pulmonary:even and unlabored  Cardiovascular:No edema or pitting edema present  Skin:Normal without rashes or lesions and well hydrated  Psychiatric:Mood and affect appropriate  Neurologic:Cranial Nerves II-XII grossly intact  Musculoskeletal:antalgic    Orders Placed This Encounter   Procedures    Ambulatory referral to Neurosurgery

## 2020-07-09 NOTE — TELEPHONE ENCOUNTER
Call from Marilyn Kelley nurse  w/c  Ph# [phone]    Caller is requesting the last office visit note   Please fax to # [phone]

## 2020-07-10 NOTE — TELEPHONE ENCOUNTER
Blanca-nurse  for W/C claim called requesting recent office notes  I faxed office notes from 7/9/20 to fax# [phone]      # [phone]

## 2020-08-07 ENCOUNTER — TELEPHONE (OUTPATIENT)
Dept: OBGYN CLINIC | Facility: HOSPITAL | Age: 35
End: 2020-08-07

## 2020-08-07 DIAGNOSIS — M51.16 LUMBAR DISC PROLAPSE WITH COMPRESSION RADICULOPATHY: ICD-10-CM

## 2020-08-07 DIAGNOSIS — M54.41 CHRONIC RIGHT-SIDED LOW BACK PAIN WITH RIGHT-SIDED SCIATICA: ICD-10-CM

## 2020-08-07 DIAGNOSIS — M51.16 LUMBAR DISC HERNIATION WITH RADICULOPATHY: Primary | ICD-10-CM

## 2020-08-07 DIAGNOSIS — M46.1 SACROILIITIS (HCC): ICD-10-CM

## 2020-08-07 DIAGNOSIS — G89.29 CHRONIC RIGHT-SIDED LOW BACK PAIN WITH RIGHT-SIDED SCIATICA: ICD-10-CM

## 2020-08-07 NOTE — TELEPHONE ENCOUNTER
DR Kay  Re: PT   Fax#[phone]    CB# [address] Joce Rahman from [facility] called and asked if DR Kay can fax a new RX for PT     Fax#[phone]

## 2020-09-14 ENCOUNTER — OFFICE VISIT (OUTPATIENT)
Dept: FAMILY MEDICINE CLINIC | Facility: CLINIC | Age: 35
End: 2020-09-14
Payer: OTHER MISCELLANEOUS

## 2020-09-14 VITALS
DIASTOLIC BLOOD PRESSURE: 70 MMHG | OXYGEN SATURATION: 98 % | TEMPERATURE: 98 F | HEIGHT: 67 IN | WEIGHT: 266 LBS | BODY MASS INDEX: 41.75 KG/M2 | HEART RATE: 84 BPM | RESPIRATION RATE: 16 BRPM | SYSTOLIC BLOOD PRESSURE: 110 MMHG

## 2020-09-14 DIAGNOSIS — Z01.818 PREOPERATIVE EXAMINATION: Primary | ICD-10-CM

## 2020-09-14 DIAGNOSIS — M51.16 LUMBAR DISC HERNIATION WITH RADICULOPATHY: ICD-10-CM

## 2020-09-14 PROCEDURE — 93000 ELECTROCARDIOGRAM COMPLETE: CPT | Performed by: FAMILY MEDICINE

## 2020-09-14 PROCEDURE — 99243 OFF/OP CNSLTJ NEW/EST LOW 30: CPT | Performed by: FAMILY MEDICINE

## 2020-09-14 NOTE — PROGRESS NOTES
BMI Counseling: Body mass index is 41 66 kg/m²  The BMI is above normal  Nutrition recommendations include decreasing portion sizes, encouraging healthy choices of fruits and vegetables, consuming healthier snacks and moderation in carbohydrate intake  Exercise recommendations include exercising 3-5 times per week  No pharmacotherapy was ordered  Assessment/Plan:         Problem List Items Addressed This Visit        Nervous and Auditory    Lumbar disc herniation with radiculopathy     Pt for L3-L4 discectomy on 9/24/20 by Dr Velia Sanderson  Other    Preoperative examination - Primary     CPE done  EKG done and ok  BP good  No signs or symptoms of acute infection  Pt cleared for surgery at low risk  Relevant Orders    POCT ECG (Completed)            Subjective:      Patient ID: Juan Carlos Spencer is a 38 y o  male  Pt here for preop eval for L3-L4 microdiscectomy on 9/24/20 by Dr Velia Sanderson  Pt has back pain and rad to R groin  Pt had R inguinal hernia repair in 2/20 and no better  Otherwise, no cp/sob  No fever or chills  No cough or nasal rafia  No n/v/d  No urinary sxs  Needs EKG  The following portions of the patient's history were reviewed and updated as appropriate:   He has a past medical history of Back pain, Migraines, Mild acid reflux, and Sleep apnea  ,  does not have any pertinent problems on file  ,   has a past surgical history that includes Nose surgery; Knee biopsy; Eye surgery (Left); Tendon release (Right, 5/1/2017); Tympanostomy tube placement (Bilateral); pr repair ing hernia,5+y/o,reducibl (Right, 1/31/2020); Hernia repair; Epidural block injection (Right, 2/28/2020); pr injection,sacroiliac joint (Right, 6/12/2020); and Cyst Removal ,  family history includes ADD / ADHD in his brother; Asthma in his sister; Breast cancer in his other; Cancer in his brother; Chiari malformation in his sister; Hypertension in his maternal grandfather, mother, and other; Migraines in his sister;  No Known Problems in his son; Other in his mother; Ulcers in his other  ,   reports that he quit smoking about 7 years ago  He has a 11 25 pack-year smoking history  He has never used smokeless tobacco  He reports current alcohol use  He reports that he does not use drugs  ,  has No Known Allergies     Current Outpatient Medications   Medication Sig Dispense Refill    ibuprofen (MOTRIN) 200 mg tablet Take by mouth every 6 (six) hours as needed for mild pain       No current facility-administered medications for this visit  Review of Systems   Constitutional: Negative for fatigue and unexpected weight change  Respiratory: Negative for cough and shortness of breath  Cardiovascular: Negative for chest pain  Gastrointestinal: Negative for abdominal pain, constipation, diarrhea and vomiting  Musculoskeletal: Positive for back pain  Negative for arthralgias  Neurological: Positive for numbness  Negative for dizziness and headaches  Psychiatric/Behavioral: Negative for dysphoric mood  The patient is not nervous/anxious  Objective:  Vitals:    09/14/20 1352   BP: 110/70   BP Location: Left arm   Patient Position: Sitting   Cuff Size: Large   Pulse: 84   Resp: 16   Temp: 98 °F (36 7 °C)   SpO2: 98%   Weight: 121 kg (266 lb)   Height: 5' 7" (1 702 m)     Body mass index is 41 66 kg/m²  Physical Exam  Vitals signs and nursing note reviewed  Constitutional:       Appearance: He is well-developed  He is not ill-appearing or toxic-appearing  HENT:      Right Ear: Tympanic membrane and ear canal normal       Left Ear: Tympanic membrane and ear canal normal       Nose: Nose normal  No congestion or rhinorrhea  Mouth/Throat:      Mouth: Mucous membranes are moist  No oral lesions  Pharynx: Oropharynx is clear  Uvula midline  No oropharyngeal exudate, posterior oropharyngeal erythema or uvula swelling  Tonsils: No tonsillar exudate     Neck:      Musculoskeletal: Normal range of motion and neck supple  Thyroid: No thyromegaly  Cardiovascular:      Rate and Rhythm: Normal rate and regular rhythm  Heart sounds: Normal heart sounds  No murmur  Pulmonary:      Effort: Pulmonary effort is normal  No respiratory distress  Breath sounds: Normal breath sounds  No wheezing or rhonchi  Abdominal:      General: Abdomen is flat  Bowel sounds are normal       Palpations: Abdomen is soft  Tenderness: There is no abdominal tenderness  Musculoskeletal:      Comments: TTP R lumbar area   Lymphadenopathy:      Cervical: No cervical adenopathy  Neurological:      Mental Status: He is alert and oriented to person, place, and time  Cranial Nerves: No cranial nerve deficit  Psychiatric:         Behavior: Behavior normal          Thought Content:  Thought content normal          Judgment: Judgment normal

## 2020-09-14 NOTE — ASSESSMENT & PLAN NOTE
CPE done  EKG done and ok  BP good  No signs or symptoms of acute infection  Pt cleared for surgery at low risk

## 2020-12-22 ENCOUNTER — TELEPHONE (OUTPATIENT)
Dept: FAMILY MEDICINE CLINIC | Facility: CLINIC | Age: 35
End: 2020-12-22

## 2020-12-22 DIAGNOSIS — Z13.89 ENCOUNTER FOR SCREENING FOR OTHER DISORDER: Primary | ICD-10-CM

## 2020-12-23 ENCOUNTER — TRANSCRIBE ORDERS (OUTPATIENT)
Dept: ADMINISTRATIVE | Facility: HOSPITAL | Age: 35
End: 2020-12-23

## 2020-12-23 ENCOUNTER — LAB (OUTPATIENT)
Dept: LAB | Facility: HOSPITAL | Age: 35
End: 2020-12-23
Payer: OTHER MISCELLANEOUS

## 2020-12-23 DIAGNOSIS — Z13.89 ENCOUNTER FOR ROUTINE SCREENING FOR MALFORMATION USING ULTRASONICS: Primary | ICD-10-CM

## 2020-12-23 DIAGNOSIS — Z13.89 ENCOUNTER FOR SCREENING FOR OTHER DISORDER: ICD-10-CM

## 2020-12-23 LAB
BUN SERPL-MCNC: 12 MG/DL (ref 5–25)
CREAT SERPL-MCNC: 0.86 MG/DL (ref 0.6–1.3)
CREAT UR-MCNC: 181 MG/DL
GFR SERPL CREATININE-BSD FRML MDRD: 111 ML/MIN/1.73SQ M
MICROALBUMIN UR-MCNC: 13.6 MG/L (ref 0–20)
MICROALBUMIN/CREAT 24H UR: 8 MG/G CREATININE (ref 0–30)

## 2020-12-23 PROCEDURE — 82565 ASSAY OF CREATININE: CPT

## 2020-12-23 PROCEDURE — 82570 ASSAY OF URINE CREATININE: CPT | Performed by: FAMILY MEDICINE

## 2020-12-23 PROCEDURE — 36415 COLL VENOUS BLD VENIPUNCTURE: CPT

## 2020-12-23 PROCEDURE — 84520 ASSAY OF UREA NITROGEN: CPT

## 2020-12-23 PROCEDURE — 82043 UR ALBUMIN QUANTITATIVE: CPT | Performed by: FAMILY MEDICINE

## 2021-04-08 ENCOUNTER — OFFICE VISIT (OUTPATIENT)
Dept: FAMILY MEDICINE CLINIC | Facility: CLINIC | Age: 36
End: 2021-04-08

## 2021-04-08 VITALS
WEIGHT: 272 LBS | HEIGHT: 67 IN | RESPIRATION RATE: 18 BRPM | DIASTOLIC BLOOD PRESSURE: 84 MMHG | HEART RATE: 80 BPM | OXYGEN SATURATION: 95 % | TEMPERATURE: 97.5 F | BODY MASS INDEX: 42.69 KG/M2 | SYSTOLIC BLOOD PRESSURE: 140 MMHG

## 2021-04-08 DIAGNOSIS — Z01.818 PREOPERATIVE EXAMINATION: Primary | ICD-10-CM

## 2021-04-08 DIAGNOSIS — G89.29 CHRONIC RIGHT-SIDED LOW BACK PAIN WITH RIGHT-SIDED SCIATICA: ICD-10-CM

## 2021-04-08 DIAGNOSIS — M54.41 CHRONIC RIGHT-SIDED LOW BACK PAIN WITH RIGHT-SIDED SCIATICA: ICD-10-CM

## 2021-04-08 PROCEDURE — 99243 OFF/OP CNSLTJ NEW/EST LOW 30: CPT | Performed by: FAMILY MEDICINE

## 2021-04-08 PROCEDURE — 93000 ELECTROCARDIOGRAM COMPLETE: CPT | Performed by: FAMILY MEDICINE

## 2021-04-08 RX ORDER — DIAZEPAM 5 MG/1
TABLET ORAL
COMMUNITY
Start: 2021-04-02

## 2021-04-08 NOTE — ASSESSMENT & PLAN NOTE
Pt had discectomy done in Sept 2020 by Dr Lona Cee and no better  Pt now going to have SI joint fusion done on April 22 by Dr Lona Cee

## 2021-04-08 NOTE — PROGRESS NOTES
BMI Counseling: Body mass index is 42 6 kg/m²  The BMI is above normal  Nutrition recommendations include decreasing portion sizes, encouraging healthy choices of fruits and vegetables, consuming healthier snacks and moderation in carbohydrate intake  Exercise recommendations include exercising 3-5 times per week  No pharmacotherapy was ordered  Assessment/Plan:         Problem List Items Addressed This Visit        Nervous and Auditory    Chronic right-sided low back pain with right-sided sciatica     Pt had discectomy done in Sept 2020 by Dr Daisy Porter and no better  Pt now going to have SI joint fusion done on April 22 by Dr Daisy Porter  Other    Preoperative examination - Primary     CPE done  EKG done and ok  BP good  No signs or symptoms of acute infection  Pt cleared for surgery at low risk  Relevant Orders    POCT ECG (Completed)            Subjective:      Patient ID: Aguila Franco is a 43 y o  male  Pt here for preop eval for SI joint fusion on 4/22/21 by Dr Daisy Porter  Pt had surgery for L3-4 discectomy in Sept 2020 and R back pain is no better  Has numbness and tingling in R leg and foot  No cp/sob  No headaches  No abd pain  No fever or chills  No cough, sinus congestion  No N/V/D  Pt needs EKG and clearance  The following portions of the patient's history were reviewed and updated as appropriate:   Past Medical History:  He has a past medical history of Back pain, Migraines, Mild acid reflux, and Sleep apnea ,  _______________________________________________________________________  Medical Problems:  does not have any pertinent problems on file ,  _______________________________________________________________________  Past Surgical History:   has a past surgical history that includes Nose surgery; Knee biopsy; Eye surgery (Left); Tendon release (Right, 5/1/2017); Tympanostomy tube placement (Bilateral); pr repair ing hernia,5+y/o,reducibl (Right, 1/31/2020);  Hernia repair; Epidural block injection (Right, 2/28/2020); pr injection,sacroiliac joint (Right, 6/12/2020); and Cyst Removal ,  _______________________________________________________________________  Family History:  family history includes ADD / ADHD in his brother; Asthma in his sister; Breast cancer in his other; Cancer in his brother; Chiari malformation in his sister; Hypertension in his maternal grandfather, mother, and other; Migraines in his sister; No Known Problems in his son; Other in his mother; Ulcers in his other ,  _______________________________________________________________________  Social History:   reports that he quit smoking about 8 years ago  His smoking use included cigarettes  He has a 15 00 pack-year smoking history  He has never used smokeless tobacco  He reports current alcohol use  He reports that he does not use drugs  ,  _______________________________________________________________________  Allergies:  is allergic to other     _______________________________________________________________________  Current Outpatient Medications   Medication Sig Dispense Refill    diazepam (VALIUM) 5 mg tablet 1 TABLET AS NEEDED ONCE A DAY ORALLY 22 DAYS      ibuprofen (MOTRIN) 200 mg tablet Take by mouth every 6 (six) hours as needed for mild pain       No current facility-administered medications for this visit       _______________________________________________________________________  Review of Systems   Constitutional: Negative for chills, fatigue, fever and unexpected weight change  HENT: Negative for congestion, ear pain, postnasal drip, rhinorrhea, sinus pressure, sinus pain, sore throat and trouble swallowing  Respiratory: Negative for cough, chest tightness, shortness of breath and wheezing  Cardiovascular: Negative for chest pain  Gastrointestinal: Negative for abdominal pain, constipation, diarrhea, nausea and vomiting  Musculoskeletal: Positive for back pain  Negative for arthralgias     Skin: Negative for rash  Neurological: Positive for numbness  Negative for dizziness and headaches  Psychiatric/Behavioral: Negative for dysphoric mood  The patient is not nervous/anxious  Objective:  Vitals:    04/08/21 1434   BP: 140/84   BP Location: Left arm   Patient Position: Sitting   Pulse: 80   Resp: 18   Temp: 97 5 °F (36 4 °C)   SpO2: 95%   Weight: 123 kg (272 lb)   Height: 5' 7" (1 702 m)     Body mass index is 42 6 kg/m²       Physical Exam

## 2021-06-28 ENCOUNTER — OFFICE VISIT (OUTPATIENT)
Dept: SURGERY | Facility: CLINIC | Age: 36
End: 2021-06-28

## 2021-06-28 VITALS
WEIGHT: 273.8 LBS | SYSTOLIC BLOOD PRESSURE: 132 MMHG | TEMPERATURE: 97.2 F | HEIGHT: 67 IN | DIASTOLIC BLOOD PRESSURE: 80 MMHG | HEART RATE: 105 BPM | BODY MASS INDEX: 42.97 KG/M2

## 2021-06-28 DIAGNOSIS — R10.31 DISCOMFORT OF RIGHT GROIN: Primary | ICD-10-CM

## 2021-06-28 PROCEDURE — 99213 OFFICE O/P EST LOW 20 MIN: CPT | Performed by: SURGERY

## 2021-06-28 NOTE — PROGRESS NOTES
[facility] History and Physical Note:    Assessment:   right groin discomfort setting previous inguinal hernia repair  No recurrent hernia on exam,  but tenderness on palpation    Plan:   CT scan abdomen and pelvis without contrast     Follow-up after CT scan to review images  If no recurrent hernia, physical therapy would be the plan and this was discussed  Chief Complaint:    "I am here for the groin pain"    HPI  Patient is status post right inguinal hernia repair with mesh 31 January 2020  He was seen in March 2020 where numbness in the ilioinguinal distribution was explained to him as normal since the ilioinguinal nerve is routinely sacrificed  His exam in March 2020 was otherwise unremarkable  Over the last year, he has had numerous visits with orthopedics and pain management for his lumbar disc herniation, sacroiliitis and right-sided sciatica  PMH:  Past Medical History:   Diagnosis Date    Back pain     L3-4  herniated disc    Migraines     Mild acid reflux     diet controlled    Sleep apnea     Hx 2014 post deviated septum repair       PSH:  Past Surgical History:   Procedure Laterality Date    CYST REMOVAL      EPIDURAL BLOCK INJECTION Right 2/28/2020    Procedure: L3 L4 Transforaminal Epidural Steroid Injection (97814 [id number]); Surgeon: Ronak Yarbrough MD;  Location: [facility] OR;  Service: Pain Management     EYE SURGERY Left     metal in eye    HERNIA REPAIR      KNEE BIOPSY      NOSE SURGERY      rhinoplasty, septoplasty    [place] INJECTION,SACROILIAC JOINT Right 6/12/2020    Procedure: RIGHT SACROILIAC JOINT INJECTION (64717);   Surgeon: Ronak Yarbrough MD;  Location: [facility] OR;  Service: Pain Management     WV REPAIR ING HERNIA,5+Y/O,REDUCIBL Right 1/31/2020    Procedure: REPAIR HERNIA INGUINAL WITH MESH;  Surgeon: René Fox MD;  Location: [address] [place] [address];  Service: General    TENDON RELEASE Right 5/1/2017    Procedure: RIGHT ELBOW EXTENSOR TENDON REPAIR;  Surgeon: Marissa Lopez MD;  Location: [facility] MAIN OR;  Service:     TYMPANOSTOMY TUBE PLACEMENT Bilateral        Home Meds:  Current Outpatient Medications on File Prior to Visit   Medication Sig Dispense Refill    diazepam (VALIUM) 5 mg tablet 1 TABLET AS NEEDED ONCE A DAY ORALLY 22 DAYS      ibuprofen (MOTRIN) 200 mg tablet Take by mouth every 6 (six) hours as needed for mild pain       No current facility-administered medications on file prior to visit  Allergies: Allergies   Allergen Reactions    Other Sneezing     Cat dander       Social Hx:  Social History     Socioeconomic History    Marital status: Single     Spouse name: Not on file    Number of children: Not on file    Years of education: Not on file    Highest education level: Not on file   Occupational History    Occupation:    Tobacco Use    Smoking status: Former Smoker     Packs/day: 1 00     Years: 15      Pack years: 15      Types: Cigarettes     Quit date:      Years since quittin 4    Smokeless tobacco: Never Used   Vaping Use    Vaping Use: Never used   Substance and Sexual Activity    Alcohol use: Yes     Comment: socially    Drug use: No    Sexual activity: Not on file   Other Topics Concern    Not on file   Social History Narrative    Most recent tobacco use screenin2018    Occupation:     Marital status: Single    Alcohol intake: Occasional    Live alone or with others: alone     Social Determinants of Health     Financial Resource Strain:     Difficulty of Paying Living Expenses:    Food Insecurity:     Worried About [address] in the Last Year:    [address] in the Last Year:    Transportation Needs:     Lack of Transportation (Medical):      Lack of Transportation (Non-Medical):    Physical Activity:     Days of Exercise per Week:     Minutes of Exercise per Session:    Stress:     Feeling of Stress :    Social Connections:     Frequency of Communication with Friends and Family:     Frequency of Social Gatherings with Friends and Family:     Attends Yazidism Services:     Active Member of Clubs or Organizations:     Attends Club or Organization Meetings:     Marital Status:    Intimate Partner Violence:     Fear of Current or Ex-Partner:     Emotionally Abused:     Physically Abused:     Sexually Abused:         Family Hx:    Family History   Problem Relation Age of Onset    Hypertension Mother     Other Mother         Cyst    Asthma Sister     Chiari malformation Sister    [place]issa [place] Migraines Sister     Hypertension Maternal Grandfather     ADD / ADHD Brother     Cancer Brother         oral chews tobacco    No Known Problems Son     Breast cancer Other     Hypertension Other     Ulcers Other          Review of Systems   Constitutional: Negative  HENT: Negative  Respiratory: Negative  Cardiovascular: Negative  Gastrointestinal: Negative  Genitourinary: Negative  Musculoskeletal: Negative  See HPI and past medical history   Neurological: Negative  Hematological: Negative  All other systems reviewed and are negative  There were no vitals taken for this visit  /80 (BP Location: Right arm, Patient Position: Sitting, Cuff Size: Large)   Pulse 105   Temp (!) 97 2 °F (36 2 °C) (Core)   Ht 5' 7" (1 702 m)   Wt 124 kg (273 lb 12 8 oz)   BMI 42 88 kg/m²     Physical Exam  Vitals reviewed  Constitutional:       General: He is not in acute distress  Appearance: He is obese  HENT:      Head: Normocephalic and atraumatic  Eyes:      Pupils: Pupils are equal, round, and reactive to light  Cardiovascular:      Rate and Rhythm: Normal rate and regular rhythm  Pulmonary:      Effort: Pulmonary effort is normal  No respiratory distress  Breath sounds: Normal breath sounds  Abdominal:      Comments: Obese, soft, nondistended, nontender      Patient has moderate tenderness to palpation in the right groin  No obvious recurrent hernia  Musculoskeletal:      Cervical back: Normal range of motion and neck supple  Lymphadenopathy:      Cervical: No cervical adenopathy         Pertinent labs reviewed    Pertinent images and available reads personally reviewed    Pertinent notes reviewed    I reviewed the last note by pain management     Odalys Warren MD [id number] [facility]  [phone]

## 2021-07-08 ENCOUNTER — OFFICE VISIT (OUTPATIENT)
Dept: SURGERY | Facility: CLINIC | Age: 36
End: 2021-07-08

## 2021-07-08 VITALS
WEIGHT: 275.2 LBS | BODY MASS INDEX: 43.19 KG/M2 | TEMPERATURE: 97.8 F | HEIGHT: 67 IN | HEART RATE: 93 BPM | DIASTOLIC BLOOD PRESSURE: 80 MMHG | SYSTOLIC BLOOD PRESSURE: 134 MMHG

## 2021-07-08 DIAGNOSIS — Z71.2 ENCOUNTER TO DISCUSS TEST RESULTS: Primary | ICD-10-CM

## 2021-07-08 PROCEDURE — 99212 OFFICE O/P EST SF 10 MIN: CPT | Performed by: SURGERY

## 2021-07-08 NOTE — PROGRESS NOTES
Patient is here for follow-up regarding CT findings  He has ight groin discomfort setting previous inguinal hernia repair  No recurrent hernia on exam,  but tenderness on palpation  Written report of his abdomen pelvis CT scan were scanned into epic  I explained, at length, to the patient that many of his symptoms should improve with appropriate diet, increased exercise and weight loss  He reports eating 3 meals per day but is unsure of how many calories he is consuming  His morbid obesity, in my opinion, is contributing to his chronic pain complaints  I offered consultation for our weight loss clinic and he will consider this  An occult finding on his abdominal CT scan included a 6 mm left lower lobe pulmonary nodule  I explained this to the patient and told him that he should follow-up with his primary care provider for further evaluation and following this, but it is not overly concerning  From a general surgery perspective, he may return to work with full duties; no restrictions

## 2021-07-08 NOTE — LETTER
July 20, 2021     Patient: Iban Brewer   YOB: 1985   Date of Visit: 7/8/2021       To Whom it May Concern:    Iban Brewer is under my professional care  He was seen in my office on 7/8/2021  From a general surgery perspective, he may return to work with full duties; no restrictions  If you have any questions or concerns, please don't hesitate to call           Sincerely,          Kobe Malik MD        CC: No Recipients

## 2022-06-01 ENCOUNTER — HOSPITAL ENCOUNTER (OUTPATIENT)
Dept: CT IMAGING | Facility: HOSPITAL | Age: 37
Discharge: HOME | End: 2022-06-01
Payer: COMMERCIAL

## 2022-06-01 DIAGNOSIS — R91.1 LUNG NODULE: ICD-10-CM

## 2022-06-01 PROCEDURE — 71250 CT THORAX DX C-: CPT

## 2022-12-23 NOTE — NURSING NOTE
Call placed to patient to discuss upcoming appointment at [facility] radiology department and complete consultation with patient  Patient is having right hip MRI arthrogram utilizing fluoroscopy  guidance  Reviewed patient's allergies, no current anticoagulant medication present, also discussed the pre and post procedure expectations  Reminded patient of location and time expected for procedure, Patient expressed understanding by verbalizing and repeating instructions

## 2023-01-04 ENCOUNTER — HOSPITAL ENCOUNTER (OUTPATIENT)
Dept: RADIOLOGY | Facility: HOSPITAL | Age: 38
Discharge: HOME | End: 2023-01-04

## 2023-01-04 DIAGNOSIS — M25.551 PAIN IN RIGHT HIP: ICD-10-CM

## 2023-01-04 RX ORDER — LIDOCAINE HYDROCHLORIDE 10 MG/ML
10 INJECTION, SOLUTION EPIDURAL; INFILTRATION; INTRACAUDAL; PERINEURAL
Status: COMPLETED | OUTPATIENT
Start: 2023-01-04 | End: 2023-01-04

## 2023-01-04 RX ADMIN — IOHEXOL 3 ML: 300 INJECTION, SOLUTION INTRAVENOUS at 14:35

## 2023-01-04 RX ADMIN — LIDOCAINE HYDROCHLORIDE 5 ML: 10 INJECTION, SOLUTION EPIDURAL; INFILTRATION; INTRACAUDAL; PERINEURAL at 14:35

## 2023-01-04 RX ADMIN — GADOBUTROL 2 ML: 604.72 INJECTION INTRAVENOUS at 14:35

## 2023-06-13 ENCOUNTER — HOSPITAL ENCOUNTER (EMERGENCY)
Facility: HOSPITAL | Age: 38
Discharge: HOME | End: 2023-06-13
Attending: EMERGENCY MEDICINE
Payer: COMMERCIAL

## 2023-06-13 ENCOUNTER — APPOINTMENT (EMERGENCY)
Dept: RADIOLOGY | Facility: HOSPITAL | Age: 38
End: 2023-06-13
Payer: COMMERCIAL

## 2023-06-13 VITALS
OXYGEN SATURATION: 97 % | SYSTOLIC BLOOD PRESSURE: 128 MMHG | HEART RATE: 88 BPM | DIASTOLIC BLOOD PRESSURE: 72 MMHG | TEMPERATURE: 97.5 F | RESPIRATION RATE: 16 BRPM

## 2023-06-13 DIAGNOSIS — R73.9 HYPERGLYCEMIA: Primary | ICD-10-CM

## 2023-06-13 LAB
ALBUMIN SERPL BCP-MCNC: 4.4 G/DL (ref 3.5–5)
ALP SERPL-CCNC: 70 U/L (ref 34–104)
ALT SERPL W P-5'-P-CCNC: 29 U/L (ref 7–52)
ANION GAP SERPL CALCULATED.3IONS-SCNC: 7 MMOL/L (ref 4–13)
AST SERPL W P-5'-P-CCNC: 17 U/L (ref 13–39)
BASOPHILS # BLD AUTO: 0.06 THOUSANDS/ÂΜL (ref 0–0.1)
BASOPHILS NFR BLD AUTO: 1 % (ref 0–1)
BILIRUB SERPL-MCNC: 0.42 MG/DL (ref 0.2–1)
BNP SERPL-MCNC: 22 PG/ML (ref 0–100)
BUN SERPL-MCNC: 10 MG/DL (ref 5–25)
CALCIUM SERPL-MCNC: 9.1 MG/DL (ref 8.4–10.2)
CARDIAC TROPONIN I PNL SERPL HS: 3 NG/L
CHLORIDE SERPL-SCNC: 100 MMOL/L (ref 96–108)
CO2 SERPL-SCNC: 27 MMOL/L (ref 21–32)
CREAT SERPL-MCNC: 0.94 MG/DL (ref 0.6–1.3)
EOSINOPHIL # BLD AUTO: 0.08 THOUSAND/ÂΜL (ref 0–0.61)
EOSINOPHIL NFR BLD AUTO: 1 % (ref 0–6)
ERYTHROCYTE [DISTWIDTH] IN BLOOD BY AUTOMATED COUNT: 12 % (ref 11.6–15.1)
GFR SERPL CREATININE-BSD FRML MDRD: 101 ML/MIN/1.73SQ M
GLUCOSE SERPL-MCNC: 345 MG/DL (ref 65–140)
HCT VFR BLD AUTO: 41.6 % (ref 36.5–49.3)
HGB BLD-MCNC: 15.1 G/DL (ref 12–17)
IMM GRANULOCYTES # BLD AUTO: 0.04 THOUSAND/UL (ref 0–0.2)
IMM GRANULOCYTES NFR BLD AUTO: 1 % (ref 0–2)
LYMPHOCYTES # BLD AUTO: 1.64 THOUSANDS/ÂΜL (ref 0.6–4.47)
LYMPHOCYTES NFR BLD AUTO: 23 % (ref 14–44)
MAGNESIUM SERPL-MCNC: 1.8 MG/DL (ref 1.9–2.7)
MCH RBC QN AUTO: 31.1 PG (ref 26.8–34.3)
MCHC RBC AUTO-ENTMCNC: 36.3 G/DL (ref 31.4–37.4)
MCV RBC AUTO: 86 FL (ref 82–98)
MONOCYTES # BLD AUTO: 0.51 THOUSAND/ÂΜL (ref 0.17–1.22)
MONOCYTES NFR BLD AUTO: 7 % (ref 4–12)
NEUTROPHILS # BLD AUTO: 4.83 THOUSANDS/ÂΜL (ref 1.85–7.62)
NEUTS SEG NFR BLD AUTO: 67 % (ref 43–75)
NRBC BLD AUTO-RTO: 0 /100 WBCS
PLATELET # BLD AUTO: 348 THOUSANDS/UL (ref 149–390)
PMV BLD AUTO: 10.5 FL (ref 8.9–12.7)
POTASSIUM SERPL-SCNC: 3.6 MMOL/L (ref 3.5–5.3)
PROT SERPL-MCNC: 7.1 G/DL (ref 6.4–8.4)
RBC # BLD AUTO: 4.86 MILLION/UL (ref 3.88–5.62)
SODIUM SERPL-SCNC: 134 MMOL/L (ref 135–147)
WBC # BLD AUTO: 7.16 THOUSAND/UL (ref 4.31–10.16)

## 2023-06-13 PROCEDURE — 36415 COLL VENOUS BLD VENIPUNCTURE: CPT | Performed by: EMERGENCY MEDICINE

## 2023-06-13 PROCEDURE — 93005 ELECTROCARDIOGRAM TRACING: CPT

## 2023-06-13 PROCEDURE — 71045 X-RAY EXAM CHEST 1 VIEW: CPT

## 2023-06-13 PROCEDURE — 80053 COMPREHEN METABOLIC PANEL: CPT | Performed by: EMERGENCY MEDICINE

## 2023-06-13 PROCEDURE — 83880 ASSAY OF NATRIURETIC PEPTIDE: CPT | Performed by: EMERGENCY MEDICINE

## 2023-06-13 PROCEDURE — 84484 ASSAY OF TROPONIN QUANT: CPT | Performed by: EMERGENCY MEDICINE

## 2023-06-13 PROCEDURE — 83735 ASSAY OF MAGNESIUM: CPT | Performed by: EMERGENCY MEDICINE

## 2023-06-13 PROCEDURE — 85025 COMPLETE CBC W/AUTO DIFF WBC: CPT | Performed by: EMERGENCY MEDICINE

## 2023-06-13 RX ORDER — LANOLIN ALCOHOL/MO/W.PET/CERES
800 CREAM (GRAM) TOPICAL ONCE
Status: COMPLETED | OUTPATIENT
Start: 2023-06-13 | End: 2023-06-13

## 2023-06-13 RX ORDER — ACETAMINOPHEN 325 MG/1
975 TABLET ORAL ONCE
Status: COMPLETED | OUTPATIENT
Start: 2023-06-13 | End: 2023-06-13

## 2023-06-13 RX ADMIN — Medication 800 MG: at 21:16

## 2023-06-13 RX ADMIN — ACETAMINOPHEN 975 MG: 325 TABLET, FILM COATED ORAL at 20:37

## 2023-06-13 RX ADMIN — METFORMIN HYDROCHLORIDE 500 MG: 500 TABLET ORAL at 21:28

## 2023-06-13 RX ADMIN — SODIUM CHLORIDE 1000 ML: 0.9 INJECTION, SOLUTION INTRAVENOUS at 20:38

## 2023-06-14 ENCOUNTER — TELEPHONE (OUTPATIENT)
Dept: ENDOCRINOLOGY | Facility: CLINIC | Age: 38
End: 2023-06-14

## 2023-06-14 NOTE — DISCHARGE INSTRUCTIONS
Blood glucose was elevated at 345  Follow up with your primary doctor/outpatient providers, and return to the emergency department for new or worsening symptoms

## 2023-06-14 NOTE — ED PROVIDER NOTES
History  Chief Complaint   Patient presents with   • Dizziness     Pt presents to the ed with a few dizzy spells, reports checking his blood pressure and it was high, no med pta      Patient is a 53-year-old male seen in the emergency department with concern for intermittent dizziness over approximately the past 1-2 days  Patient states that he felt dizzy associated with sweating this evening  Patient states that his blood pressure was elevated at home  Patient states that he has been eating normally  Patient notes no chest pain, shortness of breath, nausea, vomiting, diarrhea, weakness, numbness, tingling  Patient notes no other definite clear exacerbating or alleviating factors for his symptoms  Patient also notes occasional mild headache  Prior to Admission Medications   Prescriptions Last Dose Informant Patient Reported? Taking?   diazepam (VALIUM) 5 mg tablet   Yes No   Si TABLET AS NEEDED ONCE A DAY ORALLY 22 DAYS   ibuprofen (MOTRIN) 200 mg tablet  Self Yes No   Sig: Take by mouth every 6 (six) hours as needed for mild pain   Patient not taking: Reported on 2021      Facility-Administered Medications: None       Past Medical History:   Diagnosis Date   • Back pain     L3-4  herniated disc   • Migraines    • Mild acid reflux     diet controlled   • Sleep apnea     Hx  post deviated septum repair       Past Surgical History:   Procedure Laterality Date   • CYST REMOVAL     • EPIDURAL BLOCK INJECTION Right 2020    Procedure: L3 L4 Transforaminal Epidural Steroid Injection ([id number]); Surgeon: Adali Alcantar MD;  Location: [facility] MAIN OR;  Service: Pain Management    • EYE SURGERY Left     metal in eye   • FL INJECTION RIGHT HIP (ARTHROGRAM)  2023   • HERNIA REPAIR     • KNEE BIOPSY     • NOSE SURGERY      rhinoplasty, septoplasty   • [place] INJECT SI JOINT ARTHRGRPHY&/ANES/STEROID W/HYUN Right 2020    Procedure: RIGHT SACROILIAC JOINT INJECTION ([id number]);   Surgeon: Jose David Garcia Amie Bacon MD;  Location: [facility] MAIN OR;  Service: Pain Management    • [place] RPR 1ST INGUN HRNA AGE 5 YRS/> REDUCIBLE Right 1/31/2020    Procedure: REPAIR HERNIA INGUINAL WITH MESH;  Surgeon: Sharee Correa MD;  Location: [address] [place] [address];  Service: General   • TENDON RELEASE Right 5/1/2017    Procedure: RIGHT ELBOW EXTENSOR TENDON REPAIR;  Surgeon: Edie Woodard MD;  Location: [facility] MAIN OR;  Service:    • TYMPANOSTOMY TUBE PLACEMENT Bilateral        Family History   Problem Relation Age of Onset   • Hypertension Mother    • Other Mother         Cyst   • Asthma Sister    • Chiari malformation Sister    • Migraines Sister    • Hypertension Maternal Grandfather    • ADD / ADHD Brother    • Cancer Brother         oral chews tobacco   • No Known Problems Son    • Breast cancer Other    • Hypertension Other    • Ulcers Other      I have reviewed and agree with the history as documented  E-Cigarette/Vaping   • E-Cigarette Use Never User      E-Cigarette/Vaping Substances   • Nicotine No    • THC No    • CBD No    • Flavoring No    • Other No    • Unknown No      Social History     Tobacco Use   • Smoking status: Former     Packs/day: 1 00     Years: 15 00     Total pack years: 15 00     Types: Cigarettes     Quit date: 2013     Years since quitting: 10 4   • Smokeless tobacco: Never   Vaping Use   • Vaping Use: Never used   Substance Use Topics   • Alcohol use: Yes     Comment: socially   • Drug use: No       Review of Systems   Constitutional: Negative for chills and fever  HENT: Negative for ear pain and sore throat  Eyes: Negative for pain and visual disturbance  Respiratory: Negative for cough and shortness of breath  Cardiovascular: Negative for chest pain and palpitations  Gastrointestinal: Negative for abdominal pain and vomiting  Genitourinary: Negative for decreased urine volume and difficulty urinating  Musculoskeletal: Negative for gait problem and neck stiffness     Skin: Negative for color change and rash  Neurological: Positive for dizziness and headaches  Negative for seizures and syncope  Psychiatric/Behavioral: Negative for agitation and confusion  All other systems reviewed and are negative  Physical Exam  Physical Exam  Vitals and nursing note reviewed  Constitutional:       General: He is not in acute distress  Appearance: He is well-developed  HENT:      Head: Normocephalic and atraumatic  Right Ear: External ear normal       Left Ear: External ear normal       Nose: Nose normal       Mouth/Throat:      Pharynx: Oropharynx is clear  Eyes:      General: No scleral icterus  Conjunctiva/sclera: Conjunctivae normal    Cardiovascular:      Rate and Rhythm: Normal rate and regular rhythm  Heart sounds: No murmur heard  Pulmonary:      Effort: Pulmonary effort is normal  No respiratory distress  Breath sounds: Normal breath sounds  Abdominal:      General: Abdomen is flat  Tenderness: There is no abdominal tenderness  Musculoskeletal:         General: No deformity or signs of injury  Cervical back: Normal range of motion and neck supple  Skin:     General: Skin is warm and dry  Neurological:      General: No focal deficit present  Mental Status: He is alert  Cranial Nerves: No cranial nerve deficit  Sensory: No sensory deficit  Psychiatric:         Mood and Affect: Mood normal          Thought Content:  Thought content normal          Vital Signs  ED Triage Vitals   Temperature Pulse Respirations Blood Pressure SpO2   06/13/23 2008 06/13/23 2008 06/13/23 2008 06/13/23 2008 06/13/23 2008   97 5 °F (36 4 °C) 97 18 156/89 97 %      Temp Source Heart Rate Source Patient Position - Orthostatic VS BP Location FiO2 (%)   06/13/23 2008 06/13/23 2008 06/13/23 2008 06/13/23 2008 --   Oral Monitor Sitting Right arm       Pain Score       06/13/23 2037       6           Vitals:    06/13/23 2008 06/13/23 2116   BP: 156/89 128/72   Pulse: 97 88   Patient Position - Orthostatic VS: Sitting Sitting         Visual Acuity      ED Medications  Medications   sodium chloride 0 9 % bolus 1,000 mL (1,000 mL Intravenous New Bag 6/13/23 2038)   metFORMIN (GLUCOPHAGE) tablet 500 mg (has no administration in time range)   acetaminophen (TYLENOL) tablet 975 mg (975 mg Oral Given 6/13/23 2037)   magnesium Oxide (MAG-OX) tablet 800 mg (800 mg Oral Given 6/13/23 2116)       Diagnostic Studies  Results Reviewed     Procedure Component Value Units Date/Time    B-Type Natriuretic Peptide(BNP) [[id number]]  (Normal) Collected: 06/13/23 2029    Lab Status: Final result Specimen: Blood from Arm, Left Updated: 06/13/23 2102     BNP 22 pg/mL     HS Troponin I 2hr [[id number]]     Lab Status: No result Specimen: Blood     HS Troponin 0hr (reflex protocol) [[id number]]  (Normal) Collected: 06/13/23 2029    Lab Status: Final result Specimen: Blood from Arm, Left Updated: 06/13/23 2059     hs TnI 0hr 3 ng/L     Comprehensive metabolic panel [[id number]]  (Abnormal) Collected: 06/13/23 2029    Lab Status: Final result Specimen: Blood from Arm, Left Updated: 06/13/23 2056     Sodium 134 mmol/L      Potassium 3 6 mmol/L      Chloride 100 mmol/L      CO2 27 mmol/L      ANION GAP 7 mmol/L      BUN 10 mg/dL      Creatinine 0 94 mg/dL      Glucose 345 mg/dL      Calcium 9 1 mg/dL      AST 17 U/L      ALT 29 U/L      Alkaline Phosphatase 70 U/L      Total Protein 7 1 g/dL      Albumin 4 4 g/dL      Total Bilirubin 0 42 mg/dL      eGFR 101 ml/min/1 73sq m     Narrative:      [facility] guidelines for Chronic Kidney Disease (CKD):   •  Stage 1 with normal or high GFR (GFR > 90 mL/min/1 73 square meters)  •  Stage 2 Mild CKD (GFR = 60-89 mL/min/1 73 square meters)  •  Stage 3A Moderate CKD (GFR = 45-59 mL/min/1 73 square meters)  •  Stage 3B Moderate CKD (GFR = 30-44 mL/min/1 73 square meters)  •  Stage 4 Severe CKD (GFR = 15-29 mL/min/1 73 square meters)  •  Stage 5 End Stage CKD (GFR <15 mL/min/1 73 square meters)  Note: GFR calculation is accurate only with a steady state creatinine    Magnesium [[id number]]  (Abnormal) Collected: 06/13/23 2029    Lab Status: Final result Specimen: Blood from Arm, Left Updated: 06/13/23 2056     Magnesium 1 8 mg/dL     CBC and differential [[id number]] Collected: 06/13/23 2029    Lab Status: Final result Specimen: Blood from Arm, Left Updated: 06/13/23 2037     WBC 7 16 Thousand/uL      RBC 4 86 Million/uL      Hemoglobin 15 1 g/dL      Hematocrit 41 6 %      MCV 86 fL      MCH 31 1 pg      MCHC 36 3 g/dL      RDW 12 0 %      MPV 10 5 fL      Platelets 676 Thousands/uL      nRBC 0 /100 WBCs      Neutrophils Relative 67 %      Immat GRANS % 1 %      Lymphocytes Relative 23 %      Monocytes Relative 7 %      Eosinophils Relative 1 %      Basophils Relative 1 %      Neutrophils Absolute 4 83 Thousands/µL      Immature Grans Absolute 0 04 Thousand/uL      Lymphocytes Absolute 1 64 Thousands/µL      Monocytes Absolute 0 51 Thousand/µL      Eosinophils Absolute 0 08 Thousand/µL      Basophils Absolute 0 06 Thousands/µL                  XR chest 1 view portable   ED Interpretation by Jason Jimenez MD (06/13 2125)   No infiltrate or pneumothorax                 Procedures  ECG 12 Lead Documentation Only    Date/Time: 6/13/2023 8:26 PM    Performed by: Jason Jimenez MD  Authorized by: Jason Jimenez MD    Indications / Diagnosis:  Dizziness  ECG reviewed by me, the ED Provider: yes    Patient location:  ED  Rate:     ECG rate:  97    ECG rate assessment: normal    Rhythm:     Rhythm: sinus rhythm    QRS:     QRS axis:  Normal  ST segments:     ST segments:  Normal  T waves:     T waves: normal    Comments:      Normal sinus rhythm at 97, normal axis, , QRS 98, QTc 447, no ST-T wave abnormality, no evidence of acute ischemia             ED Course                               SBIRT 22yo+    Flowsheet Row Most Recent Value   Initial Alcohol Screen: US AUDIT-C     1  How often do you have a drink containing alcohol? 0 Filed at: 06/13/2023 2008   2  How many drinks containing alcohol do you have on a typical day you are drinking? 0 Filed at: 06/13/2023 2008   3a  Male UNDER 65: How often do you have five or more drinks on one occasion? 0 Filed at: 06/13/2023 2008   3b  FEMALE Any Age, or MALE 65+: How often do you have 4 or more drinks on one occassion? 0 Filed at: 06/13/2023 2008   Audit-C Score 0 Filed at: 06/13/2023 2008   SAM: How many times in the past year have you    Used an illegal drug or used a prescription medication for non-medical reasons? Never Filed at: 06/13/2023 2008                    Medical Decision Making  Patient is a 13-year-old male seen in the emergency department with concern for intermittent dizziness, mild headache  EKG was obtained and noted  Chest x-ray showed no infiltrate or pneumothorax  Patient was treated with medication for symptom control, with good effect  Laboratory evaluation remarkable for low sodium of 134, elevated glucose of 345, low magnesium of 1 8  Evaluation is not consistent with ACS, CVA, acute renal failure, anemia  Patient's symptoms appear to be due to new onset diabetes  Plan to start metformin, and have patient follow up with PCP/outpatient providers  Patient stable for discharge home  Discharge instructions were reviewed with patient  Hyperglycemia: acute illness or injury  Amount and/or Complexity of Data Reviewed  Labs: ordered  Decision-making details documented in ED Course  Radiology: ordered and independent interpretation performed  Decision-making details documented in ED Course  ECG/medicine tests: ordered and independent interpretation performed  Decision-making details documented in ED Course  Risk  OTC drugs  Prescription drug management            Disposition  Final diagnoses:   Hyperglycemia     Time reflects when diagnosis was documented in both MDM as applicable and the Disposition within this note     Time User Action Codes Description Comment    6/13/2023  9:21 PM Andria Sawyer Add [R73 9] Hyperglycemia       ED Disposition     ED Disposition   Discharge    Condition   Stable    Date/Time   Tue Jun 13, 2023  9:21 PM    Comment   Oddis Knee discharge to home/self care  Follow-up Information     Follow up With Specialties Details Why Contact Info Additional Information    Anel Mae MD  Call in 1 day  [facility] [place] [address] [facility] Call in 1 day  [address]  [place] [address]  [place] [address] for Diabetes and Endocrinology [facility] [address], [place], [address], [phone]          Patient's Medications   Discharge Prescriptions    METFORMIN (GLUCOPHAGE) 500 MG TABLET    Take 1 tablet (500 mg total) by mouth 2 (two) times a day with meals for 14 days Do not start before June 14, 2023         Start Date: 6/14/2023 End Date: 6/28/2023       Order Dose: 500 mg       Quantity: 28 tablet    Refills: 0           PDMP Review     None          ED Provider  Electronically Signed by           Bella Saldana MD  06/13/23 5007

## 2023-06-15 LAB
ATRIAL RATE: 97 BPM
P AXIS: 66 DEGREES
PR INTERVAL: 160 MS
QRS AXIS: 50 DEGREES
QRSD INTERVAL: 98 MS
QT INTERVAL: 352 MS
QTC INTERVAL: 447 MS
T WAVE AXIS: 65 DEGREES
VENTRICULAR RATE: 97 BPM

## 2023-06-15 PROCEDURE — 93010 ELECTROCARDIOGRAM REPORT: CPT | Performed by: INTERNAL MEDICINE

## 2024-03-24 ENCOUNTER — HOSPITAL ENCOUNTER (EMERGENCY)
Facility: HOSPITAL | Age: 39
Discharge: HOME | End: 2024-03-24
Attending: EMERGENCY MEDICINE
Payer: COMMERCIAL

## 2024-03-24 VITALS
SYSTOLIC BLOOD PRESSURE: 136 MMHG | DIASTOLIC BLOOD PRESSURE: 74 MMHG | RESPIRATION RATE: 18 BRPM | HEART RATE: 75 BPM | OXYGEN SATURATION: 97 % | TEMPERATURE: 97.8 F

## 2024-03-24 DIAGNOSIS — L60.0 INGROWN TOENAIL: Primary | ICD-10-CM

## 2024-03-24 DIAGNOSIS — L03.90 CELLULITIS: ICD-10-CM

## 2024-03-24 PROCEDURE — 99284 EMERGENCY DEPT VISIT MOD MDM: CPT | Performed by: EMERGENCY MEDICINE

## 2024-03-24 PROCEDURE — 99283 EMERGENCY DEPT VISIT LOW MDM: CPT

## 2024-03-24 RX ORDER — CEPHALEXIN 250 MG/1
500 CAPSULE ORAL ONCE
Status: COMPLETED | OUTPATIENT
Start: 2024-03-24 | End: 2024-03-24

## 2024-03-24 RX ORDER — IBUPROFEN 600 MG/1
600 TABLET ORAL ONCE
Status: COMPLETED | OUTPATIENT
Start: 2024-03-24 | End: 2024-03-24

## 2024-03-24 RX ORDER — EZETIMIBE 10 MG/1
10 TABLET ORAL DAILY
COMMUNITY

## 2024-03-24 RX ORDER — SIMVASTATIN 20 MG
20 TABLET ORAL
COMMUNITY

## 2024-03-24 RX ORDER — PANTOPRAZOLE SODIUM 40 MG/1
40 TABLET, DELAYED RELEASE ORAL 2 TIMES DAILY
COMMUNITY

## 2024-03-24 RX ORDER — OMEGA-3-ACID ETHYL ESTERS 1 G/1
2 CAPSULE, LIQUID FILLED ORAL 2 TIMES DAILY
COMMUNITY

## 2024-03-24 RX ORDER — FENOFIBRATE 145 MG/1
145 TABLET, COATED ORAL DAILY
COMMUNITY

## 2024-03-24 RX ORDER — CEPHALEXIN 500 MG/1
500 CAPSULE ORAL EVERY 6 HOURS SCHEDULED
Qty: 28 CAPSULE | Refills: 0 | Status: SHIPPED | OUTPATIENT
Start: 2024-03-24 | End: 2024-03-31

## 2024-03-24 RX ORDER — IBUPROFEN 600 MG/1
600 TABLET ORAL EVERY 6 HOURS PRN
Qty: 30 TABLET | Refills: 0 | Status: SHIPPED | OUTPATIENT
Start: 2024-03-24

## 2024-03-24 RX ADMIN — CEPHALEXIN 500 MG: 250 CAPSULE ORAL at 16:29

## 2024-03-24 RX ADMIN — IBUPROFEN 600 MG: 600 TABLET, FILM COATED ORAL at 16:29

## 2024-03-24 NOTE — ED PROVIDER NOTES
"History  Chief Complaint   Patient presents with    Toe Pain     Pt c/o pain in L big toe, he is a diabetic. Pt states \"I think the nail is ingrown\". Pt has redness and some drainage from L big toe, denies fevers. Next podiatrist appt is  per pt.      39-year-old male comes in for evaluation of toe pain.  Patient thinks he has an ingrown toe.  Now the rest of the toe has gotten red and inflamed.  Patient is concerned because he is diabetic.  He tried to call podiatry but they said they cannot see him until later in May or .      History provided by:  Patient   used: No    Toe Pain  Location:  Left great toe  Quality:  Throbbing  Severity:  Moderate  Onset quality:  Gradual  Duration:  2 weeks  Timing:  Constant  Progression:  Worsening  Chronicity:  New  Associated symptoms: rash    Associated symptoms: no abdominal pain, no chest pain, no congestion, no cough, no fever, no headaches, no vomiting and no wheezing        Prior to Admission Medications   Prescriptions Last Dose Informant Patient Reported? Taking?   diazepam (VALIUM) 5 mg tablet Not Taking  Yes No   Si TABLET AS NEEDED ONCE A DAY ORALLY 22 DAYS   Patient not taking: Reported on 3/24/2024   ezetimibe (ZETIA) 10 mg tablet 3/24/2024  Yes Yes   Sig: Take 10 mg by mouth daily   fenofibrate (TRICOR) 145 mg tablet 3/24/2024  Yes Yes   Sig: Take 145 mg by mouth daily   ibuprofen (MOTRIN) 200 mg tablet Not Taking Self Yes No   Sig: Take by mouth every 6 (six) hours as needed for mild pain   Patient not taking: Reported on 2021   metFORMIN (GLUCOPHAGE) 500 mg tablet 3/24/2024  No Yes   Sig: Take 1 tablet (500 mg total) by mouth 2 (two) times a day with meals for 14 days Do not start before 2023.   Patient taking differently: Take 500 mg by mouth 2 (two) times a day with meals   omega-3-acid ethyl esters (LOVAZA) 1 g capsule 3/24/2024  Yes Yes   Sig: Take 2 g by mouth 2 (two) times a day   pantoprazole (PROTONIX) 40 mg " tablet 3/24/2024  Yes Yes   Sig: Take 40 mg by mouth 2 (two) times a day   semaglutide, 0.25 or 0.5 mg/dose, (Ozempic, 0.25 or 0.5 MG/DOSE,) 2 mg/3 mL injection pen Past Week  Yes Yes   Sig: Inject 0.5 mg under the skin every 7 days   simvastatin (ZOCOR) 20 mg tablet 3/24/2024  Yes Yes   Sig: Take 20 mg by mouth daily at bedtime      Facility-Administered Medications: None       Past Medical History:   Diagnosis Date    Back pain     L3-4  herniated disc    Migraines     Mild acid reflux     diet controlled    Sleep apnea     Hx 2014 post deviated septum repair       Past Surgical History:   Procedure Laterality Date    CYST REMOVAL      EPIDURAL BLOCK INJECTION Right 2/28/2020    Procedure: L3 L4 Transforaminal Epidural Steroid Injection ([id number]);  Surgeon: Sarkis Dowd MD;  Location: [facility] MAIN OR;  Service: Pain Management     EYE SURGERY Left     metal in eye    FL INJECTION RIGHT HIP (ARTHROGRAM)  1/4/2023    HERNIA REPAIR      KNEE BIOPSY      NOSE SURGERY      rhinoplasty, septoplasty    [place] INJECT SI JOINT ARTHRGRPHY&/ANES/STEROID W/HYUN Right 6/12/2020    Procedure: RIGHT SACROILIAC JOINT INJECTION ([id number]);  Surgeon: Sarkis Dowd MD;  Location: [facility] MAIN OR;  Service: Pain Management     [place] RPR 1ST INGUN HRNA AGE 5 YRS/> REDUCIBLE Right 1/31/2020    Procedure: REPAIR HERNIA INGUINAL WITH MESH;  Surgeon: Rolo Fry MD;  Location: [facility] MAIN OR;  Service: General    TENDON RELEASE Right 5/1/2017    Procedure: RIGHT ELBOW EXTENSOR TENDON REPAIR;  Surgeon: Derik Leon MD;  Location: [facility] MAIN OR;  Service:     TYMPANOSTOMY TUBE PLACEMENT Bilateral        Family History   Problem Relation Age of Onset    Hypertension Mother     Other Mother         Cyst    Asthma Sister     Chiari malformation Sister     Migraines Sister     Hypertension Maternal Grandfather     ADD / ADHD Brother     Cancer Brother         oral chews tobacco    No Known Problems Son     Breast cancer Other     Hypertension  Other     Ulcers Other      I have reviewed and agree with the history as documented.    E-Cigarette/Vaping    E-Cigarette Use Never User      E-Cigarette/Vaping Substances    Nicotine No     THC No     CBD No     Flavoring No     Other No     Unknown No      Social History     Tobacco Use    Smoking status: Former     Current packs/day: 0.00     Average packs/day: 1 pack/day for 15.0 years (15.0 ttl pk-yrs)     Types: Cigarettes     Start date:      Quit date:      Years since quittin.2    Smokeless tobacco: Never   Vaping Use    Vaping status: Never Used   Substance Use Topics    Alcohol use: Yes     Comment: socially    Drug use: No       Review of Systems   Constitutional:  Negative for activity change, appetite change and fever.   HENT:  Negative for congestion and facial swelling.    Eyes:  Negative for discharge and redness.   Respiratory:  Negative for cough and wheezing.    Cardiovascular:  Negative for chest pain and leg swelling.   Gastrointestinal:  Negative for abdominal distention, abdominal pain, blood in stool and vomiting.   Endocrine: Negative for cold intolerance and polydipsia.   Genitourinary:  Negative for difficulty urinating and hematuria.   Musculoskeletal:  Negative for arthralgias and gait problem.   Skin:  Positive for rash. Negative for color change.   Allergic/Immunologic: Negative for food allergies and immunocompromised state.   Neurological:  Negative for dizziness, seizures and headaches.   Hematological:  Negative for adenopathy. Does not bruise/bleed easily.   Psychiatric/Behavioral:  Negative for agitation, confusion and decreased concentration.    All other systems reviewed and are negative.      Physical Exam  Physical Exam  Vitals and nursing note reviewed.   Constitutional:       General: He is not in acute distress.     Appearance: He is well-developed.   HENT:      Head: Normocephalic and atraumatic.   Eyes:      Conjunctiva/sclera: Conjunctivae normal.    Cardiovascular:      Rate and Rhythm: Normal rate and regular rhythm.      Heart sounds: No murmur heard.  Pulmonary:      Effort: Pulmonary effort is normal. No respiratory distress.      Breath sounds: Normal breath sounds.   Abdominal:      Palpations: Abdomen is soft.      Tenderness: There is no abdominal tenderness.   Musculoskeletal:         General: No swelling.      Cervical back: Neck supple.   Feet:      Left foot:      Skin integrity: Erythema and warmth present.      Toenail Condition: Left toenails are ingrown.   Skin:     General: Skin is warm and dry.      Capillary Refill: Capillary refill takes less than 2 seconds.   Neurological:      Mental Status: He is alert.   Psychiatric:         Mood and Affect: Mood normal.         Vital Signs  ED Triage Vitals   Temperature Pulse Respirations Blood Pressure SpO2   03/24/24 1556 03/24/24 1556 03/24/24 1556 03/24/24 1556 03/24/24 1556   97.8 °F (36.6 °C) 75 18 136/74 97 %      Temp Source Heart Rate Source Patient Position - Orthostatic VS BP Location FiO2 (%)   03/24/24 1556 -- 03/24/24 1556 03/24/24 1556 --   Oral  Lying Right arm       Pain Score       03/24/24 1629       7           Vitals:    03/24/24 1556   BP: 136/74   Pulse: 75   Patient Position - Orthostatic VS: Lying         Visual Acuity      ED Medications  Medications   cephalexin (KEFLEX) capsule 500 mg (500 mg Oral Given 3/24/24 1629)   ibuprofen (MOTRIN) tablet 600 mg (600 mg Oral Given 3/24/24 1629)       Diagnostic Studies  Results Reviewed       None                   No orders to display              Procedures  Procedures         ED Course                                             Medical Decision Making  Differential diagnosis includes but is not limited to ingrown toenail infected ingrown toenail cellulitis paronychia less likely gout    Problems Addressed:  Cellulitis: acute illness or injury  Ingrown toenail: acute illness or injury    Risk  OTC drugs.  Prescription drug  management.  Risk Details: Patient may take Tylenol over-the-counter for pain.  He given prescription for antibiotics to follow-up with podiatry             Disposition  Final diagnoses:   Ingrown toenail   Cellulitis     Time reflects when diagnosis was documented in both MDM as applicable and the Disposition within this note       Time User Action Codes Description Comment    3/24/2024  4:19 PM Yuliana Plunkett Add [L60.0] Ingrown toenail     3/24/2024  4:19 PM Yuliana Plunkett Add [L03.90] Cellulitis           ED Disposition       ED Disposition   Discharge    Condition   Stable    Date/Time   Sun Mar 24, 2024  4:19 PM    Comment   Shiva Recinos discharge to home/self care.                   Follow-up Information       Follow up With Specialties Details Why Contact Info Additional Information    [facility] Schedule an appointment as soon as possible for a visit   [address] [place] [id number] [phone]  PODIATRY [address] [address]  [phone]            Discharge Medication List as of 3/24/2024  4:21 PM        START taking these medications    Details   cephalexin (KEFLEX) 500 mg capsule Take 1 capsule (500 mg total) by mouth every 6 (six) hours for 7 days, Starting Sun 3/24/2024, Until Sun 3/31/2024, Normal           CONTINUE these medications which have CHANGED    Details   ibuprofen (MOTRIN) 600 mg tablet Take 1 tablet (600 mg total) by mouth every 6 (six) hours as needed for moderate pain or mild pain, Starting Sun 3/24/2024, Normal           CONTINUE these medications which have NOT CHANGED    Details   ezetimibe (ZETIA) 10 mg tablet Take 10 mg by mouth daily, Historical Med      fenofibrate (TRICOR) 145 mg tablet Take 145 mg by mouth daily, Historical Med      metFORMIN (GLUCOPHAGE) 500 mg tablet Take 1 tablet (500 mg total) by mouth 2 (two) times a day with meals for 14 days Do not start before June 14,  2023., Starting Wed 6/14/2023, Until Sun 3/24/2024, Normal      omega-3-acid ethyl esters (LOVAZA) 1 g capsule Take 2 g by mouth 2 (two) times a day, Historical Med      pantoprazole (PROTONIX) 40 mg tablet Take 40 mg by mouth 2 (two) times a day, Historical Med      semaglutide, 0.25 or 0.5 mg/dose, (Ozempic, 0.25 or 0.5 MG/DOSE,) 2 mg/3 mL injection pen Inject 0.5 mg under the skin every 7 days, Historical Med      simvastatin (ZOCOR) 20 mg tablet Take 20 mg by mouth daily at bedtime, Historical Med      diazepam (VALIUM) 5 mg tablet 1 TABLET AS NEEDED ONCE A DAY ORALLY 22 DAYS, Historical Med                 PDMP Review       None            ED Provider  Electronically Signed by             Yuliana Plunkett DO  03/24/24 2034

## 2024-04-08 ENCOUNTER — OFFICE VISIT (OUTPATIENT)
Dept: PODIATRY | Facility: CLINIC | Age: 39
End: 2024-04-08
Payer: COMMERCIAL

## 2024-04-08 VITALS
WEIGHT: 272.2 LBS | HEIGHT: 68 IN | BODY MASS INDEX: 41.25 KG/M2 | HEART RATE: 81 BPM | DIASTOLIC BLOOD PRESSURE: 83 MMHG | SYSTOLIC BLOOD PRESSURE: 119 MMHG

## 2024-04-08 DIAGNOSIS — L60.0 INGROWN TOENAIL: ICD-10-CM

## 2024-04-08 DIAGNOSIS — B35.3 TINEA PEDIS OF BOTH FEET: ICD-10-CM

## 2024-04-08 DIAGNOSIS — M79.675 GREAT TOE PAIN, LEFT: Primary | ICD-10-CM

## 2024-04-08 PROCEDURE — 99203 OFFICE O/P NEW LOW 30 MIN: CPT | Performed by: PODIATRIST

## 2024-04-08 PROCEDURE — 11730 AVULSION NAIL PLATE SIMPLE 1: CPT | Performed by: PODIATRIST

## 2024-04-08 RX ORDER — LANCETS 33 GAUGE
EACH MISCELLANEOUS 2 TIMES DAILY
COMMUNITY
Start: 2024-02-05

## 2024-04-08 RX ORDER — FENOFIBRATE 160 MG/1
1 TABLET ORAL DAILY
COMMUNITY

## 2024-04-08 RX ORDER — METFORMIN HYDROCHLORIDE 500 MG/1
1000 TABLET, EXTENDED RELEASE ORAL DAILY
COMMUNITY
Start: 2024-02-05

## 2024-04-08 RX ORDER — FAMOTIDINE 40 MG/1
1 TABLET, FILM COATED ORAL DAILY PRN
COMMUNITY

## 2024-04-08 RX ORDER — CLOTRIMAZOLE AND BETAMETHASONE DIPROPIONATE 10; .64 MG/G; MG/G
CREAM TOPICAL 2 TIMES DAILY
Qty: 15 G | Refills: 0 | Status: SHIPPED | OUTPATIENT
Start: 2024-04-08

## 2024-04-08 NOTE — PROGRESS NOTES
Name: Shiva Recinos      : 1985      MRN: [id number]  Encounter Provider: Pa Vera DPM  Encounter Date: 2024   Encounter department: [facility] PODIATRY [facility]    Assessment & Plan     1. Great toe pain, left  -     Nail removal    2. Ingrown toenail  -     Ambulatory Referral to Podiatry  -     Nail removal    3. Tinea pedis of both feet  -     clotrimazole-betamethasone (LOTRISONE) 1-0.05 % cream; Apply topically 2 (two) times a day      Left lateral nail border ingrown nail removal.    Return in 3 weeks.      Nail removal    Date/Time: 2024 9:30 AM    Performed by: Pa Vera DPM  Authorized by: Pa Vera DPM    Patient location:  Clinic  Indications / Diagnosis:  Ingrown nail  Universal Protocol:  Consent: Verbal consent obtained.  Risks and benefits: risks, benefits and alternatives were discussed  Consent given by: patient  Patient understanding: patient states understanding of the procedure being performed    Location:     Foot:  L big toe  Pre-procedure details:     Skin preparation:  Betadine    Preparation: Patient was prepped and draped in the usual sterile fashion    Anesthesia (see MAR for exact dosages):     Anesthesia method:  Nerve block    Block needle gauge:  25 G    Block anesthetic:  Lidocaine 1% w/o epi    Block technique:  Digital Block    Block outcome:  Anesthesia achieved  Nail Removal:     Nail removed:  Partial    Nail bed sutured: no    Post-procedure details:     Dressing:  4x4 sterile gauze, antibiotic ointment, gauze roll and petrolatum-impregnated gauze    Patient tolerance of procedure:  Tolerated well, no immediate complications  Comments:      Discussion with patient was completed today regarding diagnosis and potential etiologies as well as treatment options for this ingrown nail diagnosis.  Discussed how to avoid recurrence and possible treatment options if recurrence does occur.    Antibiotic ointment applied to  border with bandage dressing  Pt instructed to keep dressing intact for 24 hours.  After this time use triple antibiotic ointment to the area and a dry dressing changed daily  Return to clinic in about 3 weeks for reevaluation.  If ingrown nail recurs can consider use of phenol at nail matrix.  If notice any redness, swelling, drainage, or excessive pain or signs of infection to notify the office sooner.  Procedure completed without incident.  Do not soak foot.    Procedure in detail: Once the toe was anesthetized, the area was scrubbed and prepped with sterile technique.  A hemostat was used to lift up the involved border of the great toe.  Care was taken to protect the underlying nail bed.  An English anvil was used to cut back corner to the base of the nail.  A hemostat was then used to remove the nail that was ingrown.  This was then checked that the entire ingrown portion was removed.  This was removed from the operative area.  Hemostasis was achieved and dressings were applied.         Subjective     Last A1C 8.7.      Patient was seen at [facility] emergency department due to ingrowing nail.  Patient was started on Keflex 3/24/2024.  He has ingrowing nail noted to the left foot.  Had issue starting in January and had issues. Has improved with swelling, but still painful.           Review of Systems   Constitutional:  Negative for chills and fever.   Respiratory:  Negative for shortness of breath and wheezing.    Cardiovascular:  Negative for chest pain and leg swelling.   Gastrointestinal:  Negative for diarrhea, nausea and vomiting.   Neurological:  Positive for numbness.       Current Outpatient Medications on File Prior to Visit   Medication Sig   • ezetimibe (ZETIA) 10 mg tablet Take 10 mg by mouth daily   • famotidine (PEPCID) 40 MG tablet Take 1 tablet by mouth daily as needed   • fenofibrate 160 MG tablet Take 1 tablet by mouth daily   • ibuprofen (MOTRIN) 600 mg tablet Take 1 tablet (600 mg total) by  "mouth every 6 (six) hours as needed for moderate pain or mild pain   • Lancets (OneTouch Delica Plus [id number]) MISC 2 (two) times a day As directed   • metFORMIN (GLUCOPHAGE-XR) 500 mg 24 hr tablet Take 1,000 mg by mouth daily   • omega-3-acid ethyl esters (LOVAZA) 1 g capsule Take 2 g by mouth 2 (two) times a day   • pantoprazole (PROTONIX) 40 mg tablet Take 40 mg by mouth 2 (two) times a day   • semaglutide, 0.25 or 0.5 mg/dose, (Ozempic, 0.25 or 0.5 MG/DOSE,) 2 mg/3 mL injection pen Inject 0.5 mg under the skin every 7 days   • simvastatin (ZOCOR) 20 mg tablet Take 20 mg by mouth daily at bedtime   • diazepam (VALIUM) 5 mg tablet 1 TABLET AS NEEDED ONCE A DAY ORALLY 22 DAYS (Patient not taking: Reported on 3/24/2024)   • metFORMIN (GLUCOPHAGE) 500 mg tablet Take 1 tablet (500 mg total) by mouth 2 (two) times a day with meals for 14 days Do not start before June 14, 2023. (Patient taking differently: Take 500 mg by mouth 2 (two) times a day with meals)   • [DISCONTINUED] fenofibrate (TRICOR) 145 mg tablet Take 145 mg by mouth daily       Objective     /83   Pulse 81   Ht 5' 7.5\" (1.715 m) Comment: verbal  Wt 123 kg (272 lb 3.2 oz)   BMI 42.00 kg/m²     Physical Exam  Constitutional:       General: He is not in acute distress.     Appearance: Normal appearance.   Musculoskeletal:         General: Tenderness present.   Feet:      Comments: Pain on palpation noted to the left great toe lateral nail border.  There is discomfort with medial lateral squeeze at the level of the great toe.  Intact pedal pulses.  Neurological sensation is grossly intact distally.  There is some mild peeling and erythematous changes noted to the webspaces as well as on the plantar surface of the foot.  Neurological:      Mental Status: He is alert and oriented to person, place, and time.   Psychiatric:         Mood and Affect: Mood normal.         Judgment: Judgment normal.       "

## 2024-05-01 ENCOUNTER — APPOINTMENT (EMERGENCY)
Dept: CT IMAGING | Facility: HOSPITAL | Age: 39
End: 2024-05-01
Payer: COMMERCIAL

## 2024-05-01 ENCOUNTER — HOSPITAL ENCOUNTER (EMERGENCY)
Facility: HOSPITAL | Age: 39
Discharge: HOME | End: 2024-05-01
Attending: INTERNAL MEDICINE
Payer: COMMERCIAL

## 2024-05-01 VITALS
DIASTOLIC BLOOD PRESSURE: 99 MMHG | SYSTOLIC BLOOD PRESSURE: 144 MMHG | OXYGEN SATURATION: 95 % | TEMPERATURE: 98.1 F | RESPIRATION RATE: 20 BRPM | HEART RATE: 75 BPM

## 2024-05-01 DIAGNOSIS — R31.9 HEMATURIA: Primary | ICD-10-CM

## 2024-05-01 LAB
ANION GAP SERPL CALCULATED.3IONS-SCNC: 7 MMOL/L (ref 4–13)
BACTERIA UR QL AUTO: ABNORMAL /HPF
BASOPHILS # BLD AUTO: 0.05 THOUSANDS/ÂΜL (ref 0–0.1)
BASOPHILS NFR BLD AUTO: 1 % (ref 0–1)
BILIRUB UR QL STRIP: NEGATIVE
BUN SERPL-MCNC: 13 MG/DL (ref 5–25)
CALCIUM SERPL-MCNC: 9.7 MG/DL (ref 8.4–10.2)
CHLORIDE SERPL-SCNC: 102 MMOL/L (ref 96–108)
CK SERPL-CCNC: 80 U/L (ref 39–308)
CLARITY UR: ABNORMAL
CO2 SERPL-SCNC: 29 MMOL/L (ref 21–32)
COLOR UR: YELLOW
CREAT SERPL-MCNC: 1.01 MG/DL (ref 0.6–1.3)
EOSINOPHIL # BLD AUTO: 0.1 THOUSAND/ÂΜL (ref 0–0.61)
EOSINOPHIL NFR BLD AUTO: 2 % (ref 0–6)
ERYTHROCYTE [DISTWIDTH] IN BLOOD BY AUTOMATED COUNT: 12.2 % (ref 11.6–15.1)
GFR SERPL CREATININE-BSD FRML MDRD: 91 ML/MIN/1.73SQ M
GLUCOSE SERPL-MCNC: 111 MG/DL (ref 65–140)
GLUCOSE UR STRIP-MCNC: NEGATIVE MG/DL
HCT VFR BLD AUTO: 42.2 % (ref 36.5–49.3)
HGB BLD-MCNC: 14.8 G/DL (ref 12–17)
HGB UR QL STRIP.AUTO: ABNORMAL
IMM GRANULOCYTES # BLD AUTO: 0.02 THOUSAND/UL (ref 0–0.2)
IMM GRANULOCYTES NFR BLD AUTO: 0 % (ref 0–2)
KETONES UR STRIP-MCNC: NEGATIVE MG/DL
LEUKOCYTE ESTERASE UR QL STRIP: NEGATIVE
LYMPHOCYTES # BLD AUTO: 2.17 THOUSANDS/ÂΜL (ref 0.6–4.47)
LYMPHOCYTES NFR BLD AUTO: 35 % (ref 14–44)
MCH RBC QN AUTO: 30.2 PG (ref 26.8–34.3)
MCHC RBC AUTO-ENTMCNC: 35.1 G/DL (ref 31.4–37.4)
MCV RBC AUTO: 86 FL (ref 82–98)
MONOCYTES # BLD AUTO: 0.43 THOUSAND/ÂΜL (ref 0.17–1.22)
MONOCYTES NFR BLD AUTO: 7 % (ref 4–12)
MUCOUS THREADS UR QL AUTO: ABNORMAL
NEUTROPHILS # BLD AUTO: 3.36 THOUSANDS/ÂΜL (ref 1.85–7.62)
NEUTS SEG NFR BLD AUTO: 55 % (ref 43–75)
NITRITE UR QL STRIP: NEGATIVE
NON-SQ EPI CELLS URNS QL MICRO: ABNORMAL /HPF
NRBC BLD AUTO-RTO: 0 /100 WBCS
PH UR STRIP.AUTO: 7.5 [PH]
PLATELET # BLD AUTO: 359 THOUSANDS/UL (ref 149–390)
PMV BLD AUTO: 10.3 FL (ref 8.9–12.7)
POTASSIUM SERPL-SCNC: 4.1 MMOL/L (ref 3.5–5.3)
PROT UR STRIP-MCNC: NEGATIVE MG/DL
RBC # BLD AUTO: 4.9 MILLION/UL (ref 3.88–5.62)
RBC #/AREA URNS AUTO: ABNORMAL /HPF
SODIUM SERPL-SCNC: 138 MMOL/L (ref 135–147)
SP GR UR STRIP.AUTO: 1.01 (ref 1–1.03)
UROBILINOGEN UR QL STRIP.AUTO: 0.2 E.U./DL
WBC # BLD AUTO: 6.13 THOUSAND/UL (ref 4.31–10.16)
WBC #/AREA URNS AUTO: ABNORMAL /HPF

## 2024-05-01 PROCEDURE — 99284 EMERGENCY DEPT VISIT MOD MDM: CPT | Performed by: PHYSICIAN ASSISTANT

## 2024-05-01 PROCEDURE — 85025 COMPLETE CBC W/AUTO DIFF WBC: CPT | Performed by: PHYSICIAN ASSISTANT

## 2024-05-01 PROCEDURE — 96361 HYDRATE IV INFUSION ADD-ON: CPT

## 2024-05-01 PROCEDURE — 74177 CT ABD & PELVIS W/CONTRAST: CPT

## 2024-05-01 PROCEDURE — 81003 URINALYSIS AUTO W/O SCOPE: CPT | Performed by: PHYSICIAN ASSISTANT

## 2024-05-01 PROCEDURE — 99284 EMERGENCY DEPT VISIT MOD MDM: CPT

## 2024-05-01 PROCEDURE — 82550 ASSAY OF CK (CPK): CPT | Performed by: PHYSICIAN ASSISTANT

## 2024-05-01 PROCEDURE — 96360 HYDRATION IV INFUSION INIT: CPT

## 2024-05-01 PROCEDURE — 81001 URINALYSIS AUTO W/SCOPE: CPT | Performed by: PHYSICIAN ASSISTANT

## 2024-05-01 PROCEDURE — 87491 CHLMYD TRACH DNA AMP PROBE: CPT | Performed by: PHYSICIAN ASSISTANT

## 2024-05-01 PROCEDURE — 80048 BASIC METABOLIC PNL TOTAL CA: CPT | Performed by: PHYSICIAN ASSISTANT

## 2024-05-01 PROCEDURE — 87591 N.GONORRHOEAE DNA AMP PROB: CPT | Performed by: PHYSICIAN ASSISTANT

## 2024-05-01 PROCEDURE — 36415 COLL VENOUS BLD VENIPUNCTURE: CPT | Performed by: PHYSICIAN ASSISTANT

## 2024-05-01 RX ORDER — GABAPENTIN 300 MG/1
300-600 CAPSULE ORAL
COMMUNITY

## 2024-05-01 RX ORDER — CITALOPRAM 40 MG/1
40 TABLET ORAL DAILY
COMMUNITY
Start: 2024-02-01

## 2024-05-01 RX ADMIN — IOHEXOL 100 ML: 350 INJECTION, SOLUTION INTRAVENOUS at 12:18

## 2024-05-01 RX ADMIN — SODIUM CHLORIDE 1000 ML: 0.9 INJECTION, SOLUTION INTRAVENOUS at 10:53

## 2024-05-01 NOTE — ED PROVIDER NOTES
History  Chief Complaint   Patient presents with    Penis Pain     Awoke this am with blood at tip of penis, mild groin pain     This is a 39-year-old male with past medical history significant for type 2 diabetes mellitus presenting to the emergency department today for dripping blood from the tip of his penis.  The patient notes he woke up this morning with an erection.  He tried to urinate while having the erection notes no blood in the stream of urine but notes a drip of blood at the tip of his penis.  He does note that yesterday he took medication such as to sustain an erection and he did have sexual intercourse yesterday.  He denies an erection that lasted longer than 4 hours.  He denies any testicular pain or pubic pain.  He denies any dysuria or hematuria.  He has no flank pain.  He does not take any anticoagulants.  He denies possibility of sexually transmitted infections though he does participate in unprotected sexual activity.  The patient denies other complaints at this time.      History provided by:  Patient   used: No    Penis Pain  Severity:  Mild  Onset quality:  Sudden  Duration: once this AM.  Timing: once.  Chronicity:  New  Associated symptoms: no abdominal pain, no chest pain, no congestion, no cough, no diarrhea, no ear pain, no fatigue, no fever, no headaches, no loss of consciousness, no myalgias, no nausea, no rash, no rhinorrhea, no shortness of breath, no sore throat, no vomiting and no wheezing        Prior to Admission Medications   Prescriptions Last Dose Informant Patient Reported? Taking?   Lancets (OneTouch Delica Plus [id number]) MISC   Yes No   Si (two) times a day As directed   citalopram (CeleXA) 40 mg tablet 2024  Yes Yes   Sig: Take 40 mg by mouth daily   clotrimazole-betamethasone (LOTRISONE) 1-0.05 % cream   No No   Sig: Apply topically 2 (two) times a day   diazepam (VALIUM) 5 mg tablet   Yes No   Si TABLET AS NEEDED ONCE A DAY ORALLY 22  DAYS   Patient not taking: Reported on 3/24/2024   ezetimibe (ZETIA) 10 mg tablet 4/30/2024  Yes Yes   Sig: Take 10 mg by mouth daily   famotidine (PEPCID) 40 MG tablet Not Taking  Yes No   Sig: Take 1 tablet by mouth daily as needed   Patient not taking: Reported on 5/1/2024   fenofibrate 160 MG tablet 4/30/2024  Yes Yes   Sig: Take 1 tablet by mouth daily   gabapentin (NEURONTIN) 300 mg capsule 4/30/2024  Yes Yes   Sig: Take 300-600 mg by mouth daily at bedtime   ibuprofen (MOTRIN) 600 mg tablet   No No   Sig: Take 1 tablet (600 mg total) by mouth every 6 (six) hours as needed for moderate pain or mild pain   metFORMIN (GLUCOPHAGE) 500 mg tablet   No No   Sig: Take 1 tablet (500 mg total) by mouth 2 (two) times a day with meals for 14 days Do not start before June 14, 2023.   Patient taking differently: Take 500 mg by mouth 2 (two) times a day with meals   metFORMIN (GLUCOPHAGE-XR) 500 mg 24 hr tablet 4/30/2024  Yes Yes   Sig: Take 1,000 mg by mouth daily   omega-3-acid ethyl esters (LOVAZA) 1 g capsule 4/30/2024  Yes Yes   Sig: Take 2 g by mouth 2 (two) times a day   pantoprazole (PROTONIX) 40 mg tablet 4/30/2024  Yes Yes   Sig: Take 40 mg by mouth 2 (two) times a day   semaglutide, 0.25 or 0.5 mg/dose, (Ozempic, 0.25 or 0.5 MG/DOSE,) 2 mg/3 mL injection pen 4/30/2024  Yes Yes   Sig: Inject 0.5 mg under the skin every 7 days   simvastatin (ZOCOR) 20 mg tablet 4/30/2024  Yes Yes   Sig: Take 20 mg by mouth daily at bedtime      Facility-Administered Medications: None       Past Medical History:   Diagnosis Date    Back pain     L3-4  herniated disc    Diabetes mellitus (HCC)     Migraines     Mild acid reflux     diet controlled    Sleep apnea     Hx 2014 post deviated septum repair       Past Surgical History:   Procedure Laterality Date    CYST REMOVAL      EPIDURAL BLOCK INJECTION Right 2/28/2020    Procedure: L3 L4 Transforaminal Epidural Steroid Injection ([id number]);  Surgeon: Sarkis Dowd MD;  Location:  [facility] MAIN OR;  Service: Pain Management     EYE SURGERY Left     metal in eye    FL INJECTION RIGHT HIP (ARTHROGRAM)  2023    HERNIA REPAIR      KNEE BIOPSY      NOSE SURGERY      rhinoplasty, septoplasty    MD INJECT SI JOINT ARTHRGRPHY&/ANES/STEROID W/HYUN Right 2020    Procedure: RIGHT SACROILIAC JOINT INJECTION (38133);  Surgeon: Sarkis Dowd MD;  Location: [facility] MAIN OR;  Service: Pain Management     MD RPR 1ST INGUN HRNA AGE 5 YRS/> REDUCIBLE Right 2020    Procedure: REPAIR HERNIA INGUINAL WITH MESH;  Surgeon: Rolo Fry MD;  Location: [facility] MAIN OR;  Service: General    TENDON RELEASE Right 2017    Procedure: RIGHT ELBOW EXTENSOR TENDON REPAIR;  Surgeon: Derik Leon MD;  Location: [facility] MAIN OR;  Service:     TYMPANOSTOMY TUBE PLACEMENT Bilateral        Family History   Problem Relation Age of Onset    Hypertension Mother     Other Mother         Cyst    Asthma Sister     Chiari malformation Sister     Migraines Sister     Hypertension Maternal Grandfather     ADD / ADHD Brother     Cancer Brother         oral chews tobacco    No Known Problems Son     Breast cancer Other     Hypertension Other     Ulcers Other      I have reviewed and agree with the history as documented.    E-Cigarette/Vaping    E-Cigarette Use Never User      E-Cigarette/Vaping Substances    Nicotine No     THC No     CBD No     Flavoring No     Other No     Unknown No      Social History     Tobacco Use    Smoking status: Former     Current packs/day: 0.00     Average packs/day: 1 pack/day for 15.0 years (15.0 ttl pk-yrs)     Types: Cigarettes     Start date:      Quit date: 2013     Years since quittin.3    Smokeless tobacco: Never   Vaping Use    Vaping status: Never Used   Substance Use Topics    Alcohol use: Yes     Comment: socially    Drug use: No       Review of Systems   Constitutional:  Negative for appetite change, chills, diaphoresis, fatigue and fever.   HENT:  Negative for congestion,  ear pain, rhinorrhea and sore throat.    Eyes:  Negative for visual disturbance.   Respiratory:  Negative for cough, chest tightness, shortness of breath and wheezing.    Cardiovascular:  Negative for chest pain, palpitations and leg swelling.   Gastrointestinal:  Negative for abdominal pain, constipation, diarrhea, nausea and vomiting.   Genitourinary:  Positive for hematuria and penile pain. Negative for dysuria, flank pain, genital sores and testicular pain.   Musculoskeletal:  Negative for myalgias, neck pain and neck stiffness.   Skin:  Negative for rash and wound.   Neurological:  Negative for dizziness, seizures, loss of consciousness, syncope, weakness, light-headedness, numbness and headaches.   Psychiatric/Behavioral:  Negative for confusion.    All other systems reviewed and are negative.      Physical Exam  Physical Exam  Vitals and nursing note reviewed.   Constitutional:       General: He is not in acute distress.     Appearance: Normal appearance. He is normal weight. He is not ill-appearing, toxic-appearing or diaphoretic.   HENT:      Head: Normocephalic and atraumatic.      Nose: Nose normal. No congestion or rhinorrhea.      Mouth/Throat:      Mouth: Mucous membranes are moist.      Pharynx: No oropharyngeal exudate or posterior oropharyngeal erythema.   Eyes:      General: No scleral icterus.        Right eye: No discharge.         Left eye: No discharge.      Conjunctiva/sclera: Conjunctivae normal.   Cardiovascular:      Rate and Rhythm: Normal rate and regular rhythm.      Pulses: Normal pulses.      Heart sounds: Normal heart sounds. No murmur heard.     No friction rub. No gallop.   Pulmonary:      Effort: Pulmonary effort is normal. No respiratory distress.      Breath sounds: Normal breath sounds. No stridor. No wheezing, rhonchi or rales.   Chest:      Chest wall: No tenderness.   Abdominal:      General: Abdomen is flat. There is no distension.      Palpations: Abdomen is soft.       Tenderness: There is no abdominal tenderness. There is no right CVA tenderness, left CVA tenderness, guarding or rebound.   Musculoskeletal:         General: Normal range of motion.      Cervical back: Normal range of motion. No rigidity.      Right lower leg: No edema.      Left lower leg: No edema.   Skin:     General: Skin is warm and dry.      Capillary Refill: Capillary refill takes less than 2 seconds.      Coloration: Skin is not jaundiced or pale.   Neurological:      General: No focal deficit present.      Mental Status: He is alert and oriented to person, place, and time. Mental status is at baseline.   Psychiatric:         Mood and Affect: Mood normal.         Behavior: Behavior normal.         Vital Signs  ED Triage Vitals   Temperature Pulse Respirations Blood Pressure SpO2   05/01/24 1119 05/01/24 1030 05/01/24 1030 05/01/24 1030 05/01/24 1030   97.9 °F (36.6 °C) 75 20 144/99 95 %      Temp Source Heart Rate Source Patient Position - Orthostatic VS BP Location FiO2 (%)   05/01/24 1119 05/01/24 1030 05/01/24 1030 05/01/24 1030 --   Oral Monitor Sitting Right arm       Pain Score       05/01/24 1030       3           Vitals:    05/01/24 1030   BP: 144/99   Pulse: 75   Patient Position - Orthostatic VS: Sitting         Visual Acuity      ED Medications  Medications   sodium chloride 0.9 % bolus 1,000 mL (0 mL Intravenous Stopped 5/1/24 1436)   iohexol (OMNIPAQUE) 350 MG/ML injection (SINGLE-DOSE) 100 mL (100 mL Intravenous Given 5/1/24 1218)       Diagnostic Studies  Results Reviewed       Procedure Component Value Units Date/Time    Urine Microscopic [[id number]]  (Abnormal) Collected: 05/01/24 1058    Lab Status: Final result Specimen: Urine, Clean Catch Updated: 05/01/24 1246     RBC, UA Innumerable /hpf      WBC, UA 1-2 /hpf      Epithelial Cells Occasional /hpf      Bacteria, UA Occasional /hpf      MUCUS THREADS Occasional    Basic metabolic panel [[id number]] Collected: 05/01/24 1052    Lab Status:  Final result Specimen: Blood from Arm, Right Updated: 05/01/24 1131     Sodium 138 mmol/L      Potassium 4.1 mmol/L      Chloride 102 mmol/L      CO2 29 mmol/L      ANION GAP 7 mmol/L      BUN 13 mg/dL      Creatinine 1.01 mg/dL      Glucose 111 mg/dL      Calcium 9.7 mg/dL      eGFR 91 ml/min/1.73sq m     Narrative:      National Kidney Disease Foundation guidelines for Chronic Kidney Disease (CKD):     Stage 1 with normal or high GFR (GFR > 90 mL/min/1.73 square meters)    Stage 2 Mild CKD (GFR = 60-89 mL/min/1.73 square meters)    Stage 3A Moderate CKD (GFR = 45-59 mL/min/1.73 square meters)    Stage 3B Moderate CKD (GFR = 30-44 mL/min/1.73 square meters)    Stage 4 Severe CKD (GFR = 15-29 mL/min/1.73 square meters)    Stage 5 End Stage CKD (GFR <15 mL/min/1.73 square meters)  Note: GFR calculation is accurate only with a steady state creatinine    CK [[id number]]  (Normal) Collected: 05/01/24 1052    Lab Status: Final result Specimen: Blood from Arm, Right Updated: 05/01/24 1131     Total CK 80 U/L     UA w Reflex to Microscopic w Reflex to Culture [[id number]]  (Abnormal) Collected: 05/01/24 1058    Lab Status: Final result Specimen: Urine, Clean Catch Updated: 05/01/24 1110     Color, UA Yellow     Clarity, UA Slightly Cloudy     Specific Gravity, UA 1.015     pH, UA 7.5     Leukocytes, UA Negative     Nitrite, UA Negative     Protein, UA Negative mg/dl      Glucose, UA Negative mg/dl      Ketones, UA Negative mg/dl      Urobilinogen, UA 0.2 E.U./dl      Bilirubin, UA Negative     Occult Blood, UA 3+    Chlamydia/GC amplified DNA by PCR [[id number]] Collected: 05/01/24 1057    Lab Status: In process Specimen: Urine, Other Updated: 05/01/24 1100    CBC and differential [[id number]] Collected: 05/01/24 1052    Lab Status: Final result Specimen: Blood from Arm, Right Updated: 05/01/24 1059     WBC 6.13 Thousand/uL      RBC 4.90 Million/uL      Hemoglobin 14.8 g/dL      Hematocrit 42.2 %      MCV 86 fL      MCH 30.2  pg      MCHC 35.1 g/dL      RDW 12.2 %      MPV 10.3 fL      Platelets 359 Thousands/uL      nRBC 0 /100 WBCs      Segmented % 55 %      Immature Grans % 0 %      Lymphocytes % 35 %      Monocytes % 7 %      Eosinophils Relative 2 %      Basophils Relative 1 %      Absolute Neutrophils 3.36 Thousands/µL      Absolute Immature Grans 0.02 Thousand/uL      Absolute Lymphocytes 2.17 Thousands/µL      Absolute Monocytes 0.43 Thousand/µL      Eosinophils Absolute 0.10 Thousand/µL      Basophils Absolute 0.05 Thousands/µL                    CT abdomen pelvis with contrast   Final Result by Kenn Alvarado MD (05/01 1407)      No etiology identified for hematuria.      Enlarged fatty liver.         Workstation performed: [id number]                    Procedures  Procedures         ED Course  ED Course as of 05/01/24 1833   Wed May 01, 2024   1411 CT abdomen pelvis with contrast                                             Medical Decision Making  39-year-old male presenting to the emergency department today for blood at the tip of his penis.  He denies any blood in his urinary stream and denies any suprapubic pain.  Did take medication to sustain an erection and had sexual activity after this.  Vitals are stable.  Afebrile.  Physical examination is reassuring.  The patient does have blood in his urine on urinalysis.  Other labs are reassuring.  Negative CK.  Hemoglobin is 14.8 and baseline for the patient.  He was dosed with intravenous fluids while here in the emergency department and tested for sexually transmitted infections.  CT abdomen and pelvis shows no etiology of hematuria.  The patient is stable for discharge at this time.  Follow-up with urology.  Return to the emergency department for krissy hematuria or worsening symptoms.  Strict return precautions were given.  Recommend PCP follow-up as soon as possible. The patient and/or patient's proxy verify their understanding and agree to the plan at this time.  All  "questions answered to the patient and/or their proxy's satisfaction.  All labs reviewed and utilized in the medical decision making process (if labs were ordered).  Portions of the record may have been created with voice recognition software.  Occasional wrong word or \"sound a like\" substitutions may have occurred due to the inherent limitations of voice recognition software.  Read the chart carefully and recognize, using context, where substitutions have occurred.    Problems Addressed:  Hematuria: undiagnosed new problem with uncertain prognosis    Amount and/or Complexity of Data Reviewed  Labs: ordered. Decision-making details documented in ED Course.  Radiology: ordered. Decision-making details documented in ED Course.    Risk  Prescription drug management.             Disposition  Final diagnoses:   Hematuria     Time reflects when diagnosis was documented in both MDM as applicable and the Disposition within this note       Time User Action Codes Description Comment    5/1/2024  2:16 PM Jose Elias Hansen Add [R31.9] Hematuria           ED Disposition       ED Disposition   Discharge    Condition   Stable    Date/Time   Wed May 1, 2024  2:16 PM    Comment   Shiva Recinos discharge to home/self care.                   Follow-up Information       Follow up With Specialties Details Why Contact Info Additional Information    Isis Carroll MD  Schedule an appointment as soon as possible for a visit   [address] [address]  [phone]       [facility] Emergency Department Emergency Medicine Go to  If symptoms worsen [address] [address]  [phone] [facility] Emergency Department, [address] [address]    [facility] [place] [facility] Call   [address]  [address]  [facility] [address] [phone] [facility] [place], [address] [facility] [address], [place], " [place], [address] [phone]            Discharge Medication List as of 5/1/2024  2:17 PM        CONTINUE these medications which have NOT CHANGED    Details   citalopram (CeleXA) 40 mg tablet Take 40 mg by mouth daily, Starting Thu 2/1/2024, Historical Med      ezetimibe (ZETIA) 10 mg tablet Take 10 mg by mouth daily, Historical Med      fenofibrate 160 MG tablet Take 1 tablet by mouth daily, Historical Med      gabapentin (NEURONTIN) 300 mg capsule Take 300-600 mg by mouth daily at bedtime, Historical Med      metFORMIN (GLUCOPHAGE-XR) 500 mg 24 hr tablet Take 1,000 mg by mouth daily, Starting Mon 2/5/2024, Historical Med      omega-3-acid ethyl esters (LOVAZA) 1 g capsule Take 2 g by mouth 2 (two) times a day, Historical Med      pantoprazole (PROTONIX) 40 mg tablet Take 40 mg by mouth 2 (two) times a day, Historical Med      semaglutide, 0.25 or 0.5 mg/dose, (Ozempic, 0.25 or 0.5 MG/DOSE,) 2 mg/3 mL injection pen Inject 0.5 mg under the skin every 7 days, Historical Med      simvastatin (ZOCOR) 20 mg tablet Take 20 mg by mouth daily at bedtime, Historical Med      clotrimazole-betamethasone (LOTRISONE) 1-0.05 % cream Apply topically 2 (two) times a day, Starting Mon 4/8/2024, Normal      diazepam (VALIUM) 5 mg tablet 1 TABLET AS NEEDED ONCE A DAY ORALLY 22 DAYS, Historical Med      famotidine (PEPCID) 40 MG tablet Take 1 tablet by mouth daily as needed, Historical Med      ibuprofen (MOTRIN) 600 mg tablet Take 1 tablet (600 mg total) by mouth every 6 (six) hours as needed for moderate pain or mild pain, Starting Sun 3/24/2024, Normal      Lancets (OneTouch Delica Plus [id number]) MISC 2 (two) times a day As directed, Starting Mon 2/5/2024, Historical Med      metFORMIN (GLUCOPHAGE) 500 mg tablet Take 1 tablet (500 mg total) by mouth 2 (two) times a day with meals for 14 days Do not start before June 14, 2023., Starting Wed 6/14/2023, Until Sun 3/24/2024, Normal                 PDMP Review       None             ED Provider  Electronically Signed by             Jose Elias Hansen PA-C  05/01/24 9707

## 2024-05-01 NOTE — DISCHARGE INSTRUCTIONS
Please return to the emergency department for worsening symptoms including chest pain, shortness of breath, dizziness, lightheadedness, fever greater than 103, severe pain, inability to walk, fainting episodes, etc..  Please follow-up with your family practice provider as soon as possible.  Follow-up with the urologist as soon as possible.  Return to the emergency department with worsening bleeding.  Avoid sexual activity until we receive results and until your bleeding stops for at least 48 hours.

## 2024-05-02 LAB
C TRACH DNA SPEC QL NAA+PROBE: NEGATIVE
N GONORRHOEA DNA SPEC QL NAA+PROBE: NEGATIVE

## 2024-05-06 ENCOUNTER — OFFICE VISIT (OUTPATIENT)
Dept: PODIATRY | Facility: CLINIC | Age: 39
End: 2024-05-06
Payer: COMMERCIAL

## 2024-05-06 VITALS
BODY MASS INDEX: 42.69 KG/M2 | SYSTOLIC BLOOD PRESSURE: 120 MMHG | WEIGHT: 272 LBS | RESPIRATION RATE: 18 BRPM | HEART RATE: 76 BPM | DIASTOLIC BLOOD PRESSURE: 80 MMHG | HEIGHT: 67 IN

## 2024-05-06 DIAGNOSIS — M79.675 GREAT TOE PAIN, LEFT: ICD-10-CM

## 2024-05-06 DIAGNOSIS — B35.3 TINEA PEDIS OF BOTH FEET: Primary | ICD-10-CM

## 2024-05-06 DIAGNOSIS — L60.0 INGROWN TOENAIL: ICD-10-CM

## 2024-05-06 PROCEDURE — 99213 OFFICE O/P EST LOW 20 MIN: CPT | Performed by: PODIATRIST

## 2024-05-06 RX ORDER — CLOTRIMAZOLE AND BETAMETHASONE DIPROPIONATE 10; .64 MG/G; MG/G
CREAM TOPICAL 2 TIMES DAILY
Qty: 15 G | Refills: 0 | Status: SHIPPED | OUTPATIENT
Start: 2024-05-06 | End: 2024-06-03

## 2024-05-06 NOTE — PROGRESS NOTES
Name: Shiva Recinos      : 1985      MRN: [id number]  Encounter Provider: Pa Vera DPM  Encounter Date: 2024   Encounter department: [facility] POD[facility]    Assessment & Plan     1. Tinea pedis of both feet  -     clotrimazole-betamethasone (LOTRISONE) 1-0.05 % cream; Apply topically 2 (two) times a day for 28 days Apply thin layer to affected area twice daily.    2. Ingrown toenail    3. Great toe pain, left        Patient is doing well with regards to the left great toe.  Will plan on having him follow-up as needed at this time given his improvement.    I also sent him a refill on the topical Lotrisone as he did have good improvement from the previous evaluation.    Will plan on having patient return for follow-up as needed.    Return if symptoms worsen or fail to improve.    Subjective     Patient follows up on left foot great toe lateral nail removal.  Doing well regarding this. Has some pain to the left second toe medial border and the right great toe lateral border.  Using the cream for tinea pedis. Doing better, less itching.         Review of Systems   Constitutional:  Negative for chills and fever.   Respiratory:  Negative for shortness of breath.    Cardiovascular:  Negative for chest pain.   Gastrointestinal:  Negative for nausea and vomiting.       Current Outpatient Medications on File Prior to Visit   Medication Sig   • citalopram (CeleXA) 40 mg tablet Take 40 mg by mouth daily   • diazepam (VALIUM) 5 mg tablet 1 TABLET AS NEEDED ONCE A DAY ORALLY 22 DAYS (Patient not taking: Reported on 3/24/2024)   • ezetimibe (ZETIA) 10 mg tablet Take 10 mg by mouth daily   • famotidine (PEPCID) 40 MG tablet Take 1 tablet by mouth daily as needed (Patient not taking: Reported on 2024)   • fenofibrate 160 MG tablet Take 1 tablet by mouth daily   • gabapentin (NEURONTIN) 300 mg capsule Take 300-600 mg by mouth daily at bedtime   • ibuprofen (MOTRIN) 600 mg tablet Take 1  "tablet (600 mg total) by mouth every 6 (six) hours as needed for moderate pain or mild pain   • Lancets (OneTouch Delica Plus [id number]) MISC 2 (two) times a day As directed   • metFORMIN (GLUCOPHAGE) 500 mg tablet Take 1 tablet (500 mg total) by mouth 2 (two) times a day with meals for 14 days Do not start before June 14, 2023. (Patient taking differently: Take 500 mg by mouth 2 (two) times a day with meals)   • metFORMIN (GLUCOPHAGE-XR) 500 mg 24 hr tablet Take 1,000 mg by mouth daily   • omega-3-acid ethyl esters (LOVAZA) 1 g capsule Take 2 g by mouth 2 (two) times a day   • pantoprazole (PROTONIX) 40 mg tablet Take 40 mg by mouth 2 (two) times a day   • semaglutide, 0.25 or 0.5 mg/dose, (Ozempic, 0.25 or 0.5 MG/DOSE,) 2 mg/3 mL injection pen Inject 0.5 mg under the skin every 7 days   • simvastatin (ZOCOR) 20 mg tablet Take 20 mg by mouth daily at bedtime   • [DISCONTINUED] clotrimazole-betamethasone (LOTRISONE) 1-0.05 % cream Apply topically 2 (two) times a day       Objective     /80   Pulse 76   Resp 18   Ht 5' 7\" (1.702 m)   Wt 123 kg (272 lb)   BMI 42.60 kg/m²     Physical Exam  Constitutional:       Appearance: Normal appearance.   Feet:      Comments: Examination on the left great toe shows no pain on palpation noted to the medial or lateral borders.  There is some tenderness noted on palpation to the medial border of the left second digit.  There is some mild discomfort noted on palpation to the lateral border of the right great toe.  Improvement in appearance noted to the skin today on examination with less erythema and edema and less peeling noted to these areas.  There is intact pedal pulses.  Neurological sensation is grossly intact distally.  Neurological:      Mental Status: He is alert.       "

## 2024-06-04 ENCOUNTER — OFFICE VISIT (OUTPATIENT)
Dept: UROLOGY | Facility: CLINIC | Age: 39
End: 2024-06-04
Payer: COMMERCIAL

## 2024-06-04 VITALS
DIASTOLIC BLOOD PRESSURE: 70 MMHG | SYSTOLIC BLOOD PRESSURE: 122 MMHG | BODY MASS INDEX: 40.81 KG/M2 | HEART RATE: 74 BPM | WEIGHT: 260 LBS | HEIGHT: 67 IN | OXYGEN SATURATION: 97 %

## 2024-06-04 DIAGNOSIS — R31.9 HEMATURIA: ICD-10-CM

## 2024-06-04 PROCEDURE — 99203 OFFICE O/P NEW LOW 30 MIN: CPT | Performed by: PHYSICIAN ASSISTANT

## 2024-06-04 RX ORDER — SEMAGLUTIDE 2.68 MG/ML
INJECTION, SOLUTION SUBCUTANEOUS
COMMUNITY
Start: 2024-05-10

## 2024-06-04 RX ORDER — OMEPRAZOLE 40 MG/1
CAPSULE, DELAYED RELEASE ORAL
COMMUNITY

## 2024-06-04 RX ORDER — BISACODYL 5 MG/1
TABLET, DELAYED RELEASE ORAL
COMMUNITY

## 2024-06-04 RX ORDER — SEMAGLUTIDE 1.34 MG/ML
INJECTION, SOLUTION SUBCUTANEOUS
COMMUNITY
Start: 2024-04-10 | End: 2024-06-04 | Stop reason: DRUGHIGH

## 2024-06-04 RX ORDER — BLOOD SUGAR DIAGNOSTIC
STRIP MISCELLANEOUS 2 TIMES DAILY
COMMUNITY
Start: 2024-04-30

## 2024-06-04 RX ORDER — POLYETHYLENE GLYCOL 3350 17 G/17G
POWDER, FOR SOLUTION ORAL
COMMUNITY

## 2024-06-04 RX ORDER — SILDENAFIL 100 MG/1
100 TABLET, FILM COATED ORAL DAILY PRN
COMMUNITY

## 2024-06-04 RX ORDER — BLOOD-GLUCOSE METER
KIT MISCELLANEOUS 2 TIMES DAILY
COMMUNITY

## 2024-06-04 RX ORDER — OMEGA-3/DHA/EPA/FISH OIL 300-1000MG
2 CAPSULE ORAL DAILY
COMMUNITY

## 2024-06-04 NOTE — PROGRESS NOTES
UROLOGY CONSULTATION NOTE     Patient Identifiers: Shiva Recinos (MRN: [id number])  Service Requesting Consultation: Jose Elias Hansen PA-C    Service Providing Consultation:  Urology, Ildefonso Thompson PA-C  Consults  Date of Service: 6/4/2024    Reason for Consultation: Hematuria    History of Present Illness:     Shiva Recinos is a 39 y.o. male with no prior urologic history went to emergency room May 1 with blood dripping from the penis.  He showed me a picture of bright red blood on the toilet paper.  In the ER his urine dip showed 3+ microscopic blood with innumerable RBCs.  His CAT scan with contrast was unremarkable.  Unable to provide a urine sample today.  He is a non-smoker.  Multiple medical problems and orthopedic problems.  Denies any STD.  Denies any instrumentation.    Past Medical, Past Surgical History:     Past Medical History:   Diagnosis Date    Back pain     L3-4  herniated disc    Diabetes mellitus (HCC)     Migraines     Mild acid reflux     diet controlled    Sleep apnea     Hx 2014 post deviated septum repair   :    Past Surgical History:   Procedure Laterality Date    CYST REMOVAL      EPIDURAL BLOCK INJECTION Right 2/28/2020    Procedure: L3 L4 Transforaminal Epidural Steroid Injection ([id number]);  Surgeon: Sarkis Dowd MD;  Location: [facility] MAIN OR;  Service: Pain Management     EYE SURGERY Left     metal in eye    FL INJECTION RIGHT HIP (ARTHROGRAM)  1/4/2023    HERNIA REPAIR      KNEE BIOPSY      NOSE SURGERY      rhinoplasty, septoplasty    MA INJECT SI JOINT ARTHRGRPHY&/ANES/STEROID W/HYUN Right 6/12/2020    Procedure: RIGHT SACROILIAC JOINT INJECTION (62661);  Surgeon: Sarkis Dowd MD;  Location: [facility] MAIN OR;  Service: Pain Management     MA RPR 1ST INGUN HRNA AGE 5 YRS/> REDUCIBLE Right 1/31/2020    Procedure: REPAIR HERNIA INGUINAL WITH MESH;  Surgeon: Rolo Fry MD;  Location: [facility] MAIN OR;  Service: General    TENDON RELEASE Right 5/1/2017     Procedure: RIGHT ELBOW EXTENSOR TENDON REPAIR;  Surgeon: Derik Leon MD;  Location: [facility] MAIN OR;  Service:     TYMPANOSTOMY TUBE PLACEMENT Bilateral    :    Medications, Allergies:     Current Outpatient Medications:     Blood Glucose Monitoring Suppl (OneTouch Verio Reflect) w/Device KIT, 2 (two) times a day, Disp: , Rfl:     citalopram (CeleXA) 40 mg tablet, Take 40 mg by mouth daily, Disp: , Rfl:     ezetimibe (ZETIA) 10 mg tablet, Take 10 mg by mouth daily, Disp: , Rfl:     fenofibrate 160 MG tablet, Take 1 tablet by mouth daily, Disp: , Rfl:     fish oil-omega-3 fatty acids 1000 MG capsule, Take 2 g by mouth daily, Disp: , Rfl:     gabapentin (NEURONTIN) 300 mg capsule, Take 300-600 mg by mouth daily at bedtime, Disp: , Rfl:     Lancets (OneTouch Delica Plus [id number]) MISC, 2 (two) times a day As directed, Disp: , Rfl:     omega-3-acid ethyl esters (LOVAZA) 1 g capsule, Take 2 g by mouth 2 (two) times a day, Disp: , Rfl:     OneTouch Verio test strip, 2 (two) times a day As directed, Disp: , Rfl:     Ozempic, 2 MG/DOSE, 8 MG/3ML injection pen, INJECT 2 MG SUBCUTANEOUSLY ONE TIME PER WEEK, Disp: , Rfl:     pantoprazole (PROTONIX) 40 mg tablet, Take 40 mg by mouth 2 (two) times a day, Disp: , Rfl:     sildenafil (VIAGRA) 100 mg tablet, Take 100 mg by mouth daily as needed for erectile dysfunction, Disp: , Rfl:     simvastatin (ZOCOR) 20 mg tablet, Take 20 mg by mouth daily at bedtime, Disp: , Rfl:     Alum & Mag Hydroxide-Simeth (MAALOX ADVANCED PO), Take 5 mL by mouth every 6 (six) hours as needed (Patient not taking: Reported on 6/4/2024), Disp: , Rfl:     bisacodyl (DULCOLAX) 5 mg EC tablet, bisacodyl 5 mg tablet,delayed release  PLEASE SEE ATTACHED FOR DETAILED DIRECTIONS (Patient not taking: Reported on 6/4/2024), Disp: , Rfl:     clotrimazole-betamethasone (LOTRISONE) 1-0.05 % cream, Apply topically 2 (two) times a day for 28 days Apply thin layer to affected area twice daily., Disp: 15 g, Rfl:  0    diazepam (VALIUM) 5 mg tablet, 1 TABLET AS NEEDED ONCE A DAY ORALLY 22 DAYS (Patient not taking: Reported on 3/24/2024), Disp: , Rfl:     famotidine (PEPCID) 40 MG tablet, Take 1 tablet by mouth daily as needed (Patient not taking: Reported on 2024), Disp: , Rfl:     ibuprofen (MOTRIN) 600 mg tablet, Take 1 tablet (600 mg total) by mouth every 6 (six) hours as needed for moderate pain or mild pain (Patient not taking: Reported on 2024), Disp: 30 tablet, Rfl: 0    metFORMIN (GLUCOPHAGE) 500 mg tablet, Take 1 tablet (500 mg total) by mouth 2 (two) times a day with meals for 14 days Do not start before 2023. (Patient taking differently: Take 500 mg by mouth 2 (two) times a day with meals), Disp: 28 tablet, Rfl: 0    omeprazole (PriLOSEC) 40 MG capsule, omeprazole 40 mg capsule,delayed release  TAKE 1 CAPSULE (40 MG TOTAL) BY MOUTH DAILY. (Patient not taking: Reported on 2024), Disp: , Rfl:     polyethylene glycol (GLYCOLAX) 17 GM/SCOOP powder, polyethylene glycol 3350 17 gram/dose oral powder  PLEASE SEE ATTACHED FOR DETAILED DIRECTIONS (Patient not taking: Reported on 2024), Disp: , Rfl:     Allergies:  Allergies   Allergen Reactions    Other Sneezing     Cat dander   :    Social and Family History:   Social History:   Social History     Tobacco Use    Smoking status: Former     Current packs/day: 0.00     Average packs/day: 1 pack/day for 15.0 years (15.0 ttl pk-yrs)     Types: Cigarettes     Start date:      Quit date: 2013     Years since quittin.4    Smokeless tobacco: Never   Vaping Use    Vaping status: Never Used   Substance Use Topics    Alcohol use: Not Currently     Comment: socially    Drug use: No   .    Social History     Tobacco Use   Smoking Status Former    Current packs/day: 0.00    Average packs/day: 1 pack/day for 15.0 years (15.0 ttl pk-yrs)    Types: Cigarettes    Start date:     Quit date: 2013    Years since quittin.4   Smokeless Tobacco Never  "      Family History:  Family History   Problem Relation Age of Onset    Hypertension Mother     Other Mother         Cyst    Asthma Sister     Chiari malformation Sister     Migraines Sister     Hypertension Maternal Grandfather     ADD / ADHD Brother     Cancer Brother         oral chews tobacco    No Known Problems Son     Breast cancer Other     Hypertension Other     Ulcers Other    :     Review of Systems:     General: Fever, chills, or night sweats: negative  Cardiac: Negative for chest pain.    Pulmonary: Negative for shortness of breath.  Gastrointestinal: Abdominal pain negative.  Nausea, vomiting, or diarrhea negative,  Genitourinary: See HPI above.  Patient does not have hematuria.  All other systems queried were negative.    Physical Exam:   General: Patient is pleasant and in NAD. Awake and alert  /70 (BP Location: Left arm, Patient Position: Sitting, Cuff Size: Standard)   Pulse 74   Ht 5' 7\" (1.702 m)   Wt 118 kg (260 lb)   SpO2 97%   BMI 40.72 kg/m²   HEENT:  Conjunctiva are clear  Constitutional:  pleasant and cooperative     no apparent distress  Cardiac: Peripheral edema: negative  Pulmonary: Non-labored breathing  Abdomen: Soft, non-tender, non-distended.  No surgical scars.  No masses, tenderness, hernias noted.    Genitourinary: Negative CVA tenderness, negative suprapubic tenderness.  Extremities:  Moves all extremities  Neurological:CNII-XII intact. No numbness or tingling. Essentially non focal neurologic exam  Psychiatric:mood affect and behavior normal    Labs:     Lab Results   Component Value Date    HGB 14.8 05/01/2024    HCT 42.2 05/01/2024    WBC 6.13 05/01/2024     05/01/2024   ]    Lab Results   Component Value Date    K 4.1 05/01/2024     05/01/2024    CO2 29 05/01/2024    BUN 13 05/01/2024    CREATININE 1.01 05/01/2024    CALCIUM 9.7 05/01/2024   ]    Imaging:   I personally reviewed the images and report of the following studies, and reviewed them with " the patient:  IMPRESSION:     No etiology identified for hematuria.     Enlarged fatty liver.       ASSESSMENT:     #1.  Hematuria    PLAN:   -Check his microscopic urinalysis and schedule him for a cystoscopy in the office      Thank you for allowing me to participate in this patients’ care.  Please do not hesitate to call with any additional questions.  Ildefonso Thompson PA-C

## 2024-07-24 DIAGNOSIS — Z00.6 ENCOUNTER FOR EXAMINATION FOR NORMAL COMPARISON OR CONTROL IN CLINICAL RESEARCH PROGRAM: ICD-10-CM

## 2024-10-29 PROBLEM — R31.0 GROSS HEMATURIA: Status: ACTIVE | Noted: 2024-10-29

## 2024-10-29 NOTE — PROGRESS NOTES
Cystoscopy     Date/Time  10/30/2024 9:30 AM     Performed by  Yobani Weiss MD   Authorized by  Yobani Weiss MD         Procedure Details:  Procedure type: cystoscopy       Office Cystoscopy Procedure Note    Indication:    Self-limited gross hematuria, lower urinary tract symptoms, pelvic pain, disorgasmia    Informed consent   The risks, benefits, complications, treatment options, and expected outcomes were discussed with the patient. The patient concurred with the proposed plan and provided informed consent.    Anesthesia  Lidocaine jelly 2%    Procedure  The patient was placed in the supineposition, was prepped and draped in the usual manner using sterile technique, and 2% lidocaine jelly instilled into the urethra.  A 17 F flexible cystoscope was then inserted into the urethra and the urethra and bladder carefully examined.  The following findings were noted:    Findings:  Urethra:  Tight focal urethral strictures are present in the bulbar urethra.  Multiple strictures are present in tandem the smallest is approximately 12 Macedonian.    Specimens: None                 Complications:    None; patient tolerated the procedure well           Disposition: To home after 30 minute observation.           Condition: Stable

## 2024-10-30 ENCOUNTER — PROCEDURE VISIT (OUTPATIENT)
Dept: UROLOGY | Facility: CLINIC | Age: 39
End: 2024-10-30
Payer: COMMERCIAL

## 2024-10-30 VITALS
HEIGHT: 67 IN | WEIGHT: 242.4 LBS | BODY MASS INDEX: 38.04 KG/M2 | DIASTOLIC BLOOD PRESSURE: 74 MMHG | OXYGEN SATURATION: 97 % | SYSTOLIC BLOOD PRESSURE: 112 MMHG | HEART RATE: 78 BPM

## 2024-10-30 DIAGNOSIS — R31.0 GROSS HEMATURIA: Primary | ICD-10-CM

## 2024-10-30 DIAGNOSIS — N35.011 POST-TRAUMATIC BULBOUS URETHRAL STRICTURE: ICD-10-CM

## 2024-10-30 PROCEDURE — 99213 OFFICE O/P EST LOW 20 MIN: CPT | Performed by: UROLOGY

## 2024-10-30 PROCEDURE — 52000 CYSTOURETHROSCOPY: CPT | Performed by: UROLOGY

## 2024-10-30 RX ORDER — SEMAGLUTIDE 2.4 MG/.75ML
2.4 INJECTION, SOLUTION SUBCUTANEOUS WEEKLY
COMMUNITY
Start: 2024-10-07

## 2024-10-30 RX ORDER — TADALAFIL 5 MG/1
5 TABLET ORAL DAILY PRN
COMMUNITY
Start: 2024-10-04

## 2024-10-30 NOTE — LETTER
October 30, 2024     Patient: Shiva Recinos  YOB: 1985  Date of Visit: 10/30/2024      To Whom it May Concern:    Shiva Recinos is under my professional care. Shiva was seen in my office on 10/30/2024.     A cystoscopy was related to urinary and genital complaints that developed following his prior injury at work    If you have any questions or concerns, please don't hesitate to call.         Sincerely,          Yobani Weiss MD        CC: No Recipients

## 2024-11-27 ENCOUNTER — HOSPITAL ENCOUNTER (EMERGENCY)
Facility: HOSPITAL | Age: 39
Discharge: HOME | End: 2024-11-27
Attending: EMERGENCY MEDICINE
Payer: COMMERCIAL

## 2024-11-27 ENCOUNTER — NURSE TRIAGE (OUTPATIENT)
Dept: UROLOGY | Facility: CLINIC | Age: 39
End: 2024-11-27

## 2024-11-27 ENCOUNTER — APPOINTMENT (EMERGENCY)
Dept: ULTRASOUND IMAGING | Facility: HOSPITAL | Age: 39
End: 2024-11-27
Payer: COMMERCIAL

## 2024-11-27 VITALS
OXYGEN SATURATION: 97 % | RESPIRATION RATE: 16 BRPM | TEMPERATURE: 98.4 F | HEART RATE: 77 BPM | SYSTOLIC BLOOD PRESSURE: 135 MMHG | DIASTOLIC BLOOD PRESSURE: 81 MMHG

## 2024-11-27 DIAGNOSIS — R10.32 LEFT GROIN PAIN: Primary | ICD-10-CM

## 2024-11-27 DIAGNOSIS — R31.0 GROSS HEMATURIA: Primary | ICD-10-CM

## 2024-11-27 LAB
BILIRUB UR QL STRIP: NEGATIVE
CLARITY UR: CLEAR
COLOR UR: YELLOW
GLUCOSE UR STRIP-MCNC: NEGATIVE MG/DL
HGB UR QL STRIP.AUTO: NEGATIVE
KETONES UR STRIP-MCNC: NEGATIVE MG/DL
LEUKOCYTE ESTERASE UR QL STRIP: NEGATIVE
NITRITE UR QL STRIP: NEGATIVE
PH UR STRIP.AUTO: 6 [PH]
PROT UR STRIP-MCNC: NEGATIVE MG/DL
SP GR UR STRIP.AUTO: >=1.03 (ref 1–1.03)
UROBILINOGEN UR QL STRIP.AUTO: 0.2 E.U./DL

## 2024-11-27 PROCEDURE — 76705 ECHO EXAM OF ABDOMEN: CPT

## 2024-11-27 PROCEDURE — 99284 EMERGENCY DEPT VISIT MOD MDM: CPT

## 2024-11-27 PROCEDURE — 87086 URINE CULTURE/COLONY COUNT: CPT | Performed by: EMERGENCY MEDICINE

## 2024-11-27 PROCEDURE — 81003 URINALYSIS AUTO W/O SCOPE: CPT | Performed by: EMERGENCY MEDICINE

## 2024-11-27 PROCEDURE — 99284 EMERGENCY DEPT VISIT MOD MDM: CPT | Performed by: EMERGENCY MEDICINE

## 2024-11-27 NOTE — TELEPHONE ENCOUNTER
"Reason for Disposition  • All other patients with blood in urine  (Exception: Could be normal menstrual bleeding.)    Answer Assessment - Initial Assessment Questions  1. COLOR of URINE: \"Describe the color of the urine.\"  (e.g., tea-colored, pink, red, bloody) \"Do you have blood clots in your urine?\" (e.g., none, pea, grape, small coin)      Patient states he wasn't urinating when he saw the blood, dark red    2. ONSET: \"When did the bleeding start?\"       Last night    3. EPISODES: \"How many times has there been blood in the urine?\" or \"How many times today?\"      1 episode    4. PAIN with URINATION: \"Is there any pain with passing your urine?\" If Yes, ask: \"How bad is the pain?\"  (Scale 1-10; or mild, moderate, severe)      Pain in groin area- sharp pain when urinating    5. FEVER: \"Do you have a fever?\" If Yes, ask: \"What is your temperature, how was it measured, and when did it start?\"      Denies    6. ASSOCIATED SYMPTOMS: \"Are you passing urine more frequently than usual?\"      Denies    7. OTHER SYMPTOMS: \"Do you have any other symptoms?\" (e.g., back/flank pain, abdomen pain, vomiting)      Pain on back/flank from work related injury    Protocols used: Urine - Blood In-Adult-[place]    "

## 2024-11-27 NOTE — PATIENT COMMUNICATION
"Patient of Som Thompson, last seen 10/30/2024, wrote via [email or web address], stating he is experiencing pure blood coming our of the tip of the penis. He states the color is a dark red. He states he had 1 episode today. He does note pain in his groin and some sharp pain that does dissipate when urinating. He denies fever and any other urinary symptoms. I reviewed ED precautions and encouraged water intake. Please advise.      1. COLOR of URINE: \"Describe the color of the urine.\"  (e.g., tea-colored, pink, red, bloody) \"Do you have blood clots in your urine?\" (e.g., none, pea, grape, small coin)      Patient states he wasn't urinating when he saw the blood, dark red    2. ONSET: \"When did the bleeding start?\"       Last night    3. EPISODES: \"How many times has there been blood in the urine?\" or \"How many times today?\"      1 episode    4. PAIN with URINATION: \"Is there any pain with passing your urine?\" If Yes, ask: \"How bad is the pain?\"  (Scale 1-10; or mild, moderate, severe)      Pain in groin area- sharp pain when urinating    5. FEVER: \"Do you have a fever?\" If Yes, ask: \"What is your temperature, how was it measured, and when did it start?\"      Denies    6. ASSOCIATED SYMPTOMS: \"Are you passing urine more frequently than usual?\"      Denies    7. OTHER SYMPTOMS: \"Do you have any other symptoms?\" (e.g., back/flank pain, abdomen pain, vomiting)      Pain on back/flank from work related injury  "

## 2024-11-28 NOTE — ED PROVIDER NOTES
Time reflects when diagnosis was documented in both MDM as applicable and the Disposition within this note       Time User Action Codes Description Comment    11/27/2024  8:54 PM Kel Werner Add [R10.32] Left groin pain           ED Disposition       ED Disposition   Discharge    Condition   Stable    Date/Time   Wed Nov 27, 2024  8:54 PM    Comment   Shiva Recinos discharge to home/self care.                   Assessment & Plan       Medical Decision Making  39-year-old male presenting after noticing some dried blood at his urethral meatus.  Previously saw urology for this problem was diagnosed with urethral strictures.  He reports no difficulty urinating.  Normal UA in the ED today.  Also complaining of some vague left groin pain.  No abdominal tenderness.  No appreciable inguinal hernia.  Patient is requesting imaging.  Groin ultrasound was obtained.  Signed out pending ultrasound read prior to likely discharge.    Amount and/or Complexity of Data Reviewed  Labs: ordered.  Radiology: ordered.             Medications - No data to display    ED Risk Strat Scores                           SBIRT 22yo+      Flowsheet Row Most Recent Value   Initial Alcohol Screen: US AUDIT-C     1. How often do you have a drink containing alcohol? 0 Filed at: 11/27/2024 1715   2. How many drinks containing alcohol do you have on a typical day you are drinking?  0 Filed at: 11/27/2024 1715   3a. Male UNDER 65: How often do you have five or more drinks on one occasion? 0 Filed at: 11/27/2024 1715   3b. FEMALE Any Age, or MALE 65+: How often do you have 4 or more drinks on one occassion? 0 Filed at: 11/27/2024 1715   Audit-C Score 0 Filed at: 11/27/2024 1715   SAM: How many times in the past year have you...    Used an illegal drug or used a prescription medication for non-medical reasons? Never Filed at: 11/27/2024 1715                            History of Present Illness       Chief Complaint   Patient presents with    Groin Pain      Pt presents to the ed with groin pain and that started two days ago, reports blood from penis, hx of hematuria        Past Medical History:   Diagnosis Date    Back pain     L3-4  herniated disc    Diabetes mellitus (HCC)     Migraines     Mild acid reflux     diet controlled    Sleep apnea     Hx 2014 post deviated septum repair      Past Surgical History:   Procedure Laterality Date    CYST REMOVAL      EPIDURAL BLOCK INJECTION Right 2/28/2020    Procedure: L3 L4 Transforaminal Epidural Steroid Injection ([id number]);  Surgeon: Sarkis Dowd MD;  Location: [facility] MAIN OR;  Service: Pain Management     EYE SURGERY Left     metal in eye    FL INJECTION RIGHT HIP (ARTHROGRAM)  1/4/2023    HERNIA REPAIR      KNEE BIOPSY      NOSE SURGERY      rhinoplasty, septoplasty    NH INJECT SI JOINT ARTHRGRPHY&/ANES/STEROID W/HYUN Right 6/12/2020    Procedure: RIGHT SACROILIAC JOINT INJECTION (62641);  Surgeon: Sarkis Dowd MD;  Location: [facility] MAIN OR;  Service: Pain Management     NH RPR 1ST INGUN HRNA AGE 5 YRS/> REDUCIBLE Right 1/31/2020    Procedure: REPAIR HERNIA INGUINAL WITH MESH;  Surgeon: Rolo Fry MD;  Location: [facility] MAIN OR;  Service: General    TENDON RELEASE Right 5/1/2017    Procedure: RIGHT ELBOW EXTENSOR TENDON REPAIR;  Surgeon: Derik Leon MD;  Location: [facility] MAIN OR;  Service:     TYMPANOSTOMY TUBE PLACEMENT Bilateral       Family History   Problem Relation Age of Onset    Hypertension Mother     Other Mother         Cyst    Asthma Sister     Chiari malformation Sister     Migraines Sister     Hypertension Maternal Grandfather     ADD / ADHD Brother     Cancer Brother         oral chews tobacco    No Known Problems Son     Breast cancer Other     Hypertension Other     Ulcers Other       Social History     Tobacco Use    Smoking status: Former     Current packs/day: 0.00     Average packs/day: 1 pack/day for 15.0 years (15.0 ttl pk-yrs)     Types: Cigarettes     Start date: 1998     Quit  date:      Years since quittin.9    Smokeless tobacco: Never   Vaping Use    Vaping status: Never Used   Substance Use Topics    Alcohol use: Not Currently     Comment: socially    Drug use: No      E-Cigarette/Vaping    E-Cigarette Use Never User       E-Cigarette/Vaping Substances    Nicotine No     THC No     CBD No     Flavoring No     Other No     Unknown No       I have reviewed and agree with the history as documented.     39-year-old male history of diabetes previous episodes of hematuria seen by urology diagnosed with urethral stricture.  Patient presents today noting that he had some dried blood at the meatus today.  He denies noticing any blood with urination.  He denies any difficulty urination.  He does report intermittent penile pain while urinating.  This is similar to his prior episode of hematuria.  No flank pain or suprapubic pain.  Patient also notes 2 days of intermittent left inguinal pain of unclear cause.  No inciting injury.  No nausea vomiting diarrhea or internal abdominal pain.  He reports remote history of right inguinal hernia repair.  States he was told he had a left inguinal hernia as well which did not need to be repaired at that time.  No testicular pain or swelling.        Review of Systems   All other systems reviewed and are negative.          Objective       ED Triage Vitals [24]   Temperature Pulse Blood Pressure Respirations SpO2 Patient Position - Orthostatic VS   98.4 °F (36.9 °C) 84 128/78 18 95 % Lying      Temp Source Heart Rate Source BP Location FiO2 (%) Pain Score    Oral Monitor Right arm -- --      Vitals      Date and Time Temp Pulse SpO2 Resp BP Pain Score FACES Pain Rating User   24 1942 -- 77 97 % 16 135/81 -- -- DS   24 98.4 °F (36.9 °C) 84 95 % 18 128/78 -- -- SD            Physical Exam  Constitutional:       General: He is not in acute distress.  HENT:      Head: Normocephalic.      Mouth/Throat:      Mouth: Mucous  membranes are moist.   Eyes:      Conjunctiva/sclera: Conjunctivae normal.   Cardiovascular:      Rate and Rhythm: Normal rate and regular rhythm.      Heart sounds: Normal heart sounds.   Pulmonary:      Effort: Pulmonary effort is normal.   Abdominal:      Palpations: Abdomen is soft.      Tenderness: There is no abdominal tenderness.      Comments: No apparent left inguinal hernia or other mass, vague superficial tenderness to palpation, no erythema   Genitourinary:     Comments: Normal testicular exam, penile exam deferred  Musculoskeletal:         General: Normal range of motion.   Skin:     General: Skin is dry.   Neurological:      General: No focal deficit present.      Mental Status: He is alert and oriented to person, place, and time.   Psychiatric:         Mood and Affect: Mood normal.         Behavior: Behavior normal.         Results Reviewed       Procedure Component Value Units Date/Time    UA (URINE) with reflex to Scope [[id number]]  (Normal) Collected: 11/27/24 1750    Lab Status: Final result Specimen: Urine, Clean Catch Updated: 11/27/24 1756     Color, UA Yellow     Clarity, UA Clear     Specific Gravity, UA >=1.030     pH, UA 6.0     Leukocytes, UA Negative     Nitrite, UA Negative     Protein, UA Negative mg/dl      Glucose, UA Negative mg/dl      Ketones, UA Negative mg/dl      Urobilinogen, UA 0.2 E.U./dl      Bilirubin, UA Negative     Occult Blood, UA Negative    Urine culture [[id number]] Collected: 11/27/24 1750    Lab Status: In process Specimen: Urine, Clean Catch Updated: 11/27/24 1752            US groin/inguinal area   Final Interpretation by Wilton Nicole MD (11/27 2016)      Normal.         Workstation performed: [id number]             Procedures    ED Medication and Procedure Management   Prior to Admission Medications   Prescriptions Last Dose Informant Patient Reported? Taking?   Alum & Mag Hydroxide-Simeth (MAALOX ADVANCED PO)  Self Yes No   Sig: Take 5 mL by mouth every  6 (six) hours as needed   Patient not taking: Reported on 2024   Blood Glucose Monitoring Suppl (OneTouch Verio Reflect) w/Device KIT  Self Yes No   Si (two) times a day   Lancets (OneTouch Delica Plus [id number]) MISC  Self Yes No   Si (two) times a day As directed   OneTouch Verio test strip  Self Yes No   Si (two) times a day As directed   Ozempic, 2 MG/DOSE, 8 MG/3ML injection pen  Self Yes No   Sig: INJECT 2 MG SUBCUTANEOUSLY ONE TIME PER WEEK   Patient not taking: Reported on 10/30/2024   Wegovy 2.4 MG/0.75ML  Self Yes No   Sig: Inject 2.4 mg under the skin once a week   bisacodyl (DULCOLAX) 5 mg EC tablet  Self Yes No   Sig: bisacodyl 5 mg tablet,delayed release   PLEASE SEE ATTACHED FOR DETAILED DIRECTIONS   Patient not taking: Reported on 2024   citalopram (CeleXA) 40 mg tablet  Self Yes No   Sig: Take 40 mg by mouth daily   clotrimazole-betamethasone (LOTRISONE) 1-0.05 % cream   No No   Sig: Apply topically 2 (two) times a day for 28 days Apply thin layer to affected area twice daily.   Patient not taking: Reported on 10/30/2024   diazepam (VALIUM) 5 mg tablet  Self Yes No   Si TABLET AS NEEDED ONCE A DAY ORALLY 22 DAYS   Patient not taking: Reported on 3/24/2024   ezetimibe (ZETIA) 10 mg tablet  Self Yes No   Sig: Take 10 mg by mouth daily   famotidine (PEPCID) 40 MG tablet  Self Yes No   Sig: Take 1 tablet by mouth daily as needed   Patient not taking: Reported on 10/30/2024   fenofibrate 160 MG tablet  Self Yes No   Sig: Take 1 tablet by mouth daily   fish oil-omega-3 fatty acids 1000 MG capsule  Self Yes No   Sig: Take 2 g by mouth daily   gabapentin (NEURONTIN) 300 mg capsule  Self Yes No   Sig: Take 300-600 mg by mouth daily at bedtime   ibuprofen (MOTRIN) 600 mg tablet  Self No No   Sig: Take 1 tablet (600 mg total) by mouth every 6 (six) hours as needed for moderate pain or mild pain   Patient not taking: Reported on 10/30/2024   metFORMIN (GLUCOPHAGE) 500 mg tablet  Self No  No   Sig: Take 1 tablet (500 mg total) by mouth 2 (two) times a day with meals for 14 days Do not start before June 14, 2023.   Patient taking differently: Take 500 mg by mouth 2 (two) times a day with meals   omega-3-acid ethyl esters (LOVAZA) 1 g capsule  Self Yes No   Sig: Take 2 g by mouth 2 (two) times a day   omeprazole (PriLOSEC) 40 MG capsule  Self Yes No   Sig: omeprazole 40 mg capsule,delayed release   TAKE 1 CAPSULE (40 MG TOTAL) BY MOUTH DAILY.   Patient not taking: Reported on 10/30/2024   pantoprazole (PROTONIX) 40 mg tablet  Self Yes No   Sig: Take 40 mg by mouth 2 (two) times a day   polyethylene glycol (GLYCOLAX) 17 GM/SCOOP powder  Self Yes No   Sig: polyethylene glycol 3350 17 gram/dose oral powder   PLEASE SEE ATTACHED FOR DETAILED DIRECTIONS   Patient not taking: Reported on 6/4/2024   sildenafil (VIAGRA) 100 mg tablet  Self Yes No   Sig: Take 100 mg by mouth daily as needed for erectile dysfunction   Patient not taking: Reported on 10/30/2024   simvastatin (ZOCOR) 20 mg tablet  Self Yes No   Sig: Take 20 mg by mouth daily at bedtime   tadalafil (CIALIS) 5 MG tablet  Self Yes No   Sig: Take 5 mg by mouth daily as needed      Facility-Administered Medications: None     Discharge Medication List as of 11/27/2024  8:55 PM        CONTINUE these medications which have NOT CHANGED    Details   Alum & Mag Hydroxide-Simeth (MAALOX ADVANCED PO) Take 5 mL by mouth every 6 (six) hours as needed, Starting Mon 3/11/2024, Historical Med      bisacodyl (DULCOLAX) 5 mg EC tablet bisacodyl 5 mg tablet,delayed release   PLEASE SEE ATTACHED FOR DETAILED DIRECTIONS, Historical Med      Blood Glucose Monitoring Suppl (OneTouch Verio Reflect) w/Device KIT 2 (two) times a day, Historical Med      citalopram (CeleXA) 40 mg tablet Take 40 mg by mouth daily, Starting u 2/1/2024, Historical Med      clotrimazole-betamethasone (LOTRISONE) 1-0.05 % cream Apply topically 2 (two) times a day for 28 days Apply thin layer to  affected area twice daily., Starting Mon 5/6/2024, Until Mon 6/3/2024, Normal      diazepam (VALIUM) 5 mg tablet 1 TABLET AS NEEDED ONCE A DAY ORALLY 22 DAYS, Historical Med      ezetimibe (ZETIA) 10 mg tablet Take 10 mg by mouth daily, Historical Med      famotidine (PEPCID) 40 MG tablet Take 1 tablet by mouth daily as needed, Historical Med      fenofibrate 160 MG tablet Take 1 tablet by mouth daily, Historical Med      fish oil-omega-3 fatty acids 1000 MG capsule Take 2 g by mouth daily, Historical Med      gabapentin (NEURONTIN) 300 mg capsule Take 300-600 mg by mouth daily at bedtime, Historical Med      ibuprofen (MOTRIN) 600 mg tablet Take 1 tablet (600 mg total) by mouth every 6 (six) hours as needed for moderate pain or mild pain, Starting Sun 3/24/2024, Normal      Lancets (OneTouch Delica Plus [id number]) MISC 2 (two) times a day As directed, Starting Mon 2/5/2024, Historical Med      metFORMIN (GLUCOPHAGE) 500 mg tablet Take 1 tablet (500 mg total) by mouth 2 (two) times a day with meals for 14 days Do not start before June 14, 2023., Starting Wed 6/14/2023, Until Wed 10/30/2024, Normal      omega-3-acid ethyl esters (LOVAZA) 1 g capsule Take 2 g by mouth 2 (two) times a day, Historical Med      omeprazole (PriLOSEC) 40 MG capsule omeprazole 40 mg capsule,delayed release   TAKE 1 CAPSULE (40 MG TOTAL) BY MOUTH DAILY., Historical Med      OneTouch Verio test strip 2 (two) times a day As directed, Starting Tue 4/30/2024, Historical Med      Ozempic, 2 MG/DOSE, 8 MG/3ML injection pen INJECT 2 MG SUBCUTANEOUSLY ONE TIME PER WEEK, Historical Med      pantoprazole (PROTONIX) 40 mg tablet Take 40 mg by mouth 2 (two) times a day, Historical Med      polyethylene glycol (GLYCOLAX) 17 GM/SCOOP powder polyethylene glycol 3350 17 gram/dose oral powder   PLEASE SEE ATTACHED FOR DETAILED DIRECTIONS, Historical Med      sildenafil (VIAGRA) 100 mg tablet Take 100 mg by mouth daily as needed for erectile dysfunction,  Historical Med      simvastatin (ZOCOR) 20 mg tablet Take 20 mg by mouth daily at bedtime, Historical Med      tadalafil (CIALIS) 5 MG tablet Take 5 mg by mouth daily as needed, Starting Fri 10/4/2024, Historical Med      Wegovy 2.4 MG/0.75ML Inject 2.4 mg under the skin once a week, Starting Mon 10/7/2024, Historical Med           No discharge procedures on file.  ED SEPSIS DOCUMENTATION   Time reflects when diagnosis was documented in both MDM as applicable and the Disposition within this note       Time User Action Codes Description Comment    11/27/2024  8:54 PM Werner Begum Add [R10.32] Left groin pain                  Brenda Reyes MD  11/28/24 075

## 2024-11-28 NOTE — ED CARE HANDOFF
"Emergency Department Sign Out Note        Sign out and transfer of care from Dr. Reyes. See Separate Emergency Department note.     The patient, Shiva Recinos, was evaluated by the previous provider for left-sided groin pain.    Workup Completed:  Initial evaluation, UA, inguinal/groin ultrasound (pending results)    ED Course / Workup Pending (followup):                                    ED Course as of 11/27/24 2102 Wed Nov 27, 2024   1900 SO: 40 y/o male. Presenting with left groin pain and also \"saw a bit of blood at the tip of the penis.\" Hx of bilateral inguinal hernias. Has had prior hematuria and following with urology in the outpatient setting, diagnosed with urethral stricture. UA with no hematuria tonight, no evidence of UTI. Ultrasound results pending.   2049 US groin/inguinal area  IMPRESSION:     Normal.     Procedures  Medical Decision Making  This patient was initially seen by Dr. Reyes and I assumed care of the patient at change of shift, pending completion of workup.  This is a 40 y/o male. Presenting with left groin pain and also \"saw a bit of blood at the tip of the penis.\" Hx of bilateral inguinal hernias. Has had prior hematuria and following with urology in the outpatient setting, diagnosed with urethral stricture. UA with no hematuria tonight, no evidence of UTI. Ultrasound results pending at time of signout.  Ultrasound results returned and show no acute findings.  I discussed findings with the patient and recommended symptomatic management as well as outpatient follow-up with urology, whom he was already following in the office. I discussed all findings, treatment, red flags/return precautions, and outpatient follow-up and the patient/family understand and agree. Stable for discharge.    Amount and/or Complexity of Data Reviewed  Labs: ordered.  Radiology: ordered. Decision-making details documented in ED Course.            Disposition  Final diagnoses:   Left groin pain     Time " reflects when diagnosis was documented in both MDM as applicable and the Disposition within this note       Time User Action Codes Description Comment    11/27/2024  8:54 PM Werner Begum Add [R10.32] Left groin pain           ED Disposition       ED Disposition   Discharge    Condition   Stable    Date/Time   Wed Nov 27, 2024  8:54 PM    Comment   Shiva Recinos discharge to home/self care.                   Follow-up Information       Follow up With Specialties Details Why Contact Info Additional Information    Isis Carroll MD  Call in 1 week For follow-up [address] [address]  [phone]       [facility] Call in 1 week For follow-up [address] [address] [phone] [facility], [address] [place] [address], [place], [place], [address] [phone]    [facility] Emergency Department Emergency Medicine Go to  If symptoms worsen [address] [address]  [phone] [facility] Emergency Department, [address] [address]          Patient's Medications   Discharge Prescriptions    No medications on file     No discharge procedures on file.       ED Provider  Electronically Signed by     Werner Begum MD  11/27/24 8666

## 2024-11-29 LAB — BACTERIA UR CULT: NORMAL

## 2025-02-20 ENCOUNTER — TELEPHONE (OUTPATIENT)
Age: 40
End: 2025-02-20

## 2025-02-27 ENCOUNTER — TELEPHONE (OUTPATIENT)
Age: 40
End: 2025-02-27

## 2025-02-27 NOTE — TELEPHONE ENCOUNTER
2/27/25 SPOKE TO DONG ABOUT  INFO AND GAVE HIM OUR NEW FAX NUMBER,HE IS CALLING HIS  TO HAVE THIS INFO FAXED TO US.

## 2025-03-03 ENCOUNTER — OFFICE VISIT (OUTPATIENT)
Age: 40
End: 2025-03-03
Payer: OTHER MISCELLANEOUS

## 2025-03-03 VITALS
TEMPERATURE: 97.6 F | DIASTOLIC BLOOD PRESSURE: 72 MMHG | OXYGEN SATURATION: 98 % | WEIGHT: 249 LBS | SYSTOLIC BLOOD PRESSURE: 130 MMHG | HEIGHT: 68 IN | HEART RATE: 68 BPM | BODY MASS INDEX: 37.74 KG/M2

## 2025-03-03 DIAGNOSIS — G89.29 CHRONIC PAIN OF RIGHT GROIN: Primary | ICD-10-CM

## 2025-03-03 DIAGNOSIS — R10.31 CHRONIC PAIN OF RIGHT GROIN: Primary | ICD-10-CM

## 2025-03-03 PROCEDURE — 99214 OFFICE O/P EST MOD 30 MIN: CPT | Performed by: SURGERY

## 2025-03-03 NOTE — PROGRESS NOTES
"Name: Shiva Recinos      : 1985      MRN: [id number]  Encounter Provider: Rolo Fry MD  Encounter Date: 3/3/2025   Encounter department: [facility] [place]  :  Assessment & Plan  Chronic pain of right groin    Orders:    US scrotum and groin area; Future    I explained to the patient at length that since his pain preoperatively continued postoperatively that this was not from his right inguinal canal cord lipoma.  He also has no obvious recurrent hernia, but strongly requests \"another workup\"  .  I will order a right groin and scrotal ultrasound to evaluate for any general surgical pathology.  I will call him with the results and see him back if the ultrasound would indicate an issue which would require surgery.  Overall, patient is very dissatisfied with the care rendered by me since his chronic right back and groin pain was not improved after the surgery 5 years ago.  I offered a second opinion with another general surgeon and he declined.    History of Present Illness   HPI  Shiva Recinos is a 39 y.o. male with obesity (BMI 38), low back issues, chronic pain syndrome who was seen previously by me in  after extensive workup including pelvic MRI revealed right greater than left cord lipomas.  The right groin was explored and a cord lipoma was resected with mesh repair of the inguinal canal floor.  At that time, he had a smaller cord lipoma on the left.  He recently mentioned left groin discomfort during an ED visit 2024 .  An ultrasound was performed on 2024:  IMPRESSION:  Normal.    He returns to the clinic now stating that the pain that he had in his right groin and hip area before the right inguinal hernia repair surgery 5 years ago is the same pain that he has now and this has been continuous since \"my Worker's Comp injury\".  \"I just want the pain to go away\"          Review of Systems  Medical History Reviewed by provider this encounter:     .  Past " Medical History   Past Medical History:   Diagnosis Date    Back pain     L3-4  herniated disc    Diabetes mellitus (HCC)     Migraines     Mild acid reflux     diet controlled    Sleep apnea     Hx 2014 post deviated septum repair     Past Surgical History:   Procedure Laterality Date    CYST REMOVAL      EPIDURAL BLOCK INJECTION Right 2/28/2020    Procedure: L3 L4 Transforaminal Epidural Steroid Injection ([id number]);  Surgeon: Sarkis Dowd MD;  Location: [facility] MAIN OR;  Service: Pain Management     EYE SURGERY Left     metal in eye    FL INJECTION RIGHT HIP (ARTHROGRAM)  1/4/2023    HERNIA REPAIR      KNEE BIOPSY      NOSE SURGERY      rhinoplasty, septoplasty    NM INJECT SI JOINT ARTHRGRPHY&/ANES/STEROID W/HYUN Right 6/12/2020    Procedure: RIGHT SACROILIAC JOINT INJECTION ([id number]);  Surgeon: Sarkis Dowd MD;  Location: [facility] MAIN OR;  Service: Pain Management     NM RPR 1ST INGUN HRNA AGE 5 YRS/> REDUCIBLE Right 1/31/2020    Procedure: REPAIR HERNIA INGUINAL WITH MESH;  Surgeon: Rolo Fry MD;  Location: [facility] MAIN OR;  Service: General    TENDON RELEASE Right 5/1/2017    Procedure: RIGHT ELBOW EXTENSOR TENDON REPAIR;  Surgeon: Derik Leon MD;  Location: [facility] MAIN OR;  Service:     TYMPANOSTOMY TUBE PLACEMENT Bilateral      Family History   Problem Relation Age of Onset    Hypertension Mother     Other Mother         Cyst    Asthma Sister     Chiari malformation Sister     Migraines Sister     Hypertension Maternal Grandfather     ADD / ADHD Brother     Cancer Brother         oral chews tobacco    No Known Problems Son     Breast cancer Other     Hypertension Other     Ulcers Other       reports that he quit smoking about 12 years ago. His smoking use included cigarettes. He started smoking about 27 years ago. He has a 15 pack-year smoking history. He has never used smokeless tobacco. He reports that he does not currently use alcohol. He reports that he does not use drugs.  Current Outpatient  Medications   Medication Instructions    Alum & Mag Hydroxide-Simeth (MAALOX ADVANCED PO) 5 mL, Every 6 hours PRN    bisacodyl (DULCOLAX) 5 mg EC tablet bisacodyl 5 mg tablet,delayed release   PLEASE SEE ATTACHED FOR DETAILED DIRECTIONS    Blood Glucose Monitoring Suppl (OneTouch Verio Reflect) w/Device KIT 2 times daily    citalopram (CELEXA) 40 mg, Oral, Daily    clotrimazole-betamethasone (LOTRISONE) 1-0.05 % cream Topical, 2 times daily, Apply thin layer to affected area twice daily.    diazepam (VALIUM) 5 mg tablet 1 TABLET AS NEEDED ONCE A DAY ORALLY 22 DAYS    ezetimibe (ZETIA) 10 mg, Oral, Daily    famotidine (PEPCID) 40 MG tablet 1 tablet, Daily PRN    fenofibrate 160 MG tablet 1 tablet, Oral, Daily    fish oil-omega-3 fatty acids 2 g, Oral, Daily    gabapentin (NEURONTIN) 300-600 mg, Oral, Daily at bedtime    ibuprofen (MOTRIN) 600 mg, Oral, Every 6 hours PRN    Lancets (OneTouch Delica Plus [id number]) MISC 2 times daily, As directed    metFORMIN (GLUCOPHAGE) 500 mg, Oral, 2 times daily with meals    omega-3-acid ethyl esters (LOVAZA) 2 g, Oral, 2 times daily    omeprazole (PriLOSEC) 40 MG capsule omeprazole 40 mg capsule,delayed release   TAKE 1 CAPSULE (40 MG TOTAL) BY MOUTH DAILY.    OneTouch Verio test strip 2 times daily, As directed    Ozempic, 2 MG/DOSE, 8 MG/3ML injection pen INJECT 2 MG SUBCUTANEOUSLY ONE TIME PER WEEK    pantoprazole (PROTONIX) 40 mg, Oral, 2 times daily    polyethylene glycol (GLYCOLAX) 17 GM/SCOOP powder polyethylene glycol 3350 17 gram/dose oral powder   PLEASE SEE ATTACHED FOR DETAILED DIRECTIONS    sildenafil (VIAGRA) 100 mg, Daily PRN    simvastatin (ZOCOR) 20 mg, Oral, Daily at bedtime    tadalafil (CIALIS) 5 mg, Oral, Daily PRN    Wegovy 2.4 mg, Subcutaneous, Weekly     Allergies   Allergen Reactions    Other Sneezing     Cat dander      Current Outpatient Medications on File Prior to Visit   Medication Sig Dispense Refill    Alum & Mag Hydroxide-Simeth (MAALOX ADVANCED  PO) Take 5 mL by mouth every 6 (six) hours as needed (Patient not taking: Reported on 6/4/2024)      bisacodyl (DULCOLAX) 5 mg EC tablet bisacodyl 5 mg tablet,delayed release   PLEASE SEE ATTACHED FOR DETAILED DIRECTIONS (Patient not taking: Reported on 6/4/2024)      Blood Glucose Monitoring Suppl (OneTouch Verio Reflect) w/Device KIT 2 (two) times a day      citalopram (CeleXA) 40 mg tablet Take 40 mg by mouth daily      clotrimazole-betamethasone (LOTRISONE) 1-0.05 % cream Apply topically 2 (two) times a day for 28 days Apply thin layer to affected area twice daily. (Patient not taking: Reported on 10/30/2024) 15 g 0    diazepam (VALIUM) 5 mg tablet 1 TABLET AS NEEDED ONCE A DAY ORALLY 22 DAYS (Patient not taking: Reported on 3/24/2024)      ezetimibe (ZETIA) 10 mg tablet Take 10 mg by mouth daily      famotidine (PEPCID) 40 MG tablet Take 1 tablet by mouth daily as needed (Patient not taking: Reported on 10/30/2024)      fenofibrate 160 MG tablet Take 1 tablet by mouth daily      fish oil-omega-3 fatty acids 1000 MG capsule Take 2 g by mouth daily      gabapentin (NEURONTIN) 300 mg capsule Take 300-600 mg by mouth daily at bedtime      ibuprofen (MOTRIN) 600 mg tablet Take 1 tablet (600 mg total) by mouth every 6 (six) hours as needed for moderate pain or mild pain (Patient not taking: Reported on 10/30/2024) 30 tablet 0    Lancets (OneTouch Delica Plus [id number]) MISC 2 (two) times a day As directed      metFORMIN (GLUCOPHAGE) 500 mg tablet Take 1 tablet (500 mg total) by mouth 2 (two) times a day with meals for 14 days Do not start before June 14, 2023. (Patient taking differently: Take 500 mg by mouth 2 (two) times a day with meals) 28 tablet 0    omega-3-acid ethyl esters (LOVAZA) 1 g capsule Take 2 g by mouth 2 (two) times a day      omeprazole (PriLOSEC) 40 MG capsule omeprazole 40 mg capsule,delayed release   TAKE 1 CAPSULE (40 MG TOTAL) BY MOUTH DAILY. (Patient not taking: Reported on 10/30/2024)       OneTouch Verio test strip 2 (two) times a day As directed      Ozempic, 2 MG/DOSE, 8 MG/3ML injection pen INJECT 2 MG SUBCUTANEOUSLY ONE TIME PER WEEK (Patient not taking: Reported on 10/30/2024)      pantoprazole (PROTONIX) 40 mg tablet Take 40 mg by mouth 2 (two) times a day      polyethylene glycol (GLYCOLAX) 17 GM/SCOOP powder polyethylene glycol 3350 17 gram/dose oral powder   PLEASE SEE ATTACHED FOR DETAILED DIRECTIONS (Patient not taking: Reported on 2024)      sildenafil (VIAGRA) 100 mg tablet Take 100 mg by mouth daily as needed for erectile dysfunction (Patient not taking: Reported on 10/30/2024)      simvastatin (ZOCOR) 20 mg tablet Take 20 mg by mouth daily at bedtime      tadalafil (CIALIS) 5 MG tablet Take 5 mg by mouth daily as needed      Wegovy 2.4 MG/0.75ML Inject 2.4 mg under the skin once a week       No current facility-administered medications on file prior to visit.      Social History     Tobacco Use    Smoking status: Former     Current packs/day: 0.00     Average packs/day: 1 pack/day for 15.0 years (15.0 ttl pk-yrs)     Types: Cigarettes     Start date:      Quit date: 2013     Years since quittin.1    Smokeless tobacco: Never   Vaping Use    Vaping status: Never Used   Substance and Sexual Activity    Alcohol use: Not Currently     Comment: socially    Drug use: No    Sexual activity: Yes     Partners: Female        Objective   There were no vitals taken for this visit.     Physical Exam    Administrative Statements   I have spent a total time of 30 minutes in caring for this patient on the day of the visit/encounter including Diagnostic results, Instructions for management, Patient and family education, Impressions, Documenting in the medical record, Obtaining or reviewing history  , and reviewing documentation and ultrasound results from 2020 for emergency department visit .

## 2025-04-07 ENCOUNTER — TELEPHONE (OUTPATIENT)
Age: 40
End: 2025-04-07

## 2025-04-07 NOTE — TELEPHONE ENCOUNTER
A rep from patient's [employer], [employer] called in to request the office notes from his visit on 3/3 with Dr Fry.  I tried several times to fax them from [employer] but it keeps failing.  Could someone in the office try to fax them from the fax machine?  The number is . Thanks.

## 2025-04-23 NOTE — PATIENT INSTRUCTIONS
Additional Testing:    -You are not in need of cerebrovascular imaging at this time.     Headache/migraine treatment:    Prevention: To take every day to help prevent headaches - not to take at the time of headache:  -Start propranolol 40mg twice daily. I am hopeful that this will help prevent migraines as well as headaches with sexual activity but we may need to add another medication to take 30-60 minutes prior to sexual activity     -Over the counter preventive supplements for headaches/migraines (if you try, try for 3 months straight):  -Magnesium 400mg daily (If any diarrhea or upset stomach, decrease dose as tolerated)  -Riboflavin (Vitamin B2) 400mg daily (may make your urine bright/neon yellow)  - All supplements can be purchased online    Abortive: For immediate treatment of a headache/migraine:  -Start Zomig nasal spray. One spray into each nostril at the onset of a headache. May repeat again in 2 hours if not completely headache free. No more than 2 doses in 24 hours   -It is ok to take ibuprofen, acetaminophen or naproxen (Advil, Tylenol,  Aleve, Excedrin) if they help your headaches you should limit these to No more than 3 times a week to avoid medication overuse/rebound headaches.     Lifestyle Recommendations:  -Remain well-hydrated drinking at least 48 to 64 ounces of noncaffeinated beverages per day in addition to anything caffeinated.    -It is important to eat meals throughout the day and not go long periods of time between eating.  -Getting adequate rest is also very important for migraine prevention (aim for 7-8 hours per night).     -Regular exercise is also beneficial for headache prevention.  I would encourage at the least 5 days of physical exercise weekly for at least 30 minutes.   -I would like for them to keep track of their migraines using an application on their phone or calendar as they see fit. Phone applications: Migraine Bo or Migraine Diary.    Education and Follow-up:  -Please  call or send a [employer] message with any questions or concerns. Please present to the emergency room with any concerning symptoms such as: worst headache of your life, sudden painless loss of vision or double vision, difficulty speaking or swallowing, vertigo/room spinning that does not quickly resolve, or weakness/numbness/loss of coordination affecting 1 side of the face or body.  -Follow up in 2 months or sooner if needed.

## 2025-04-23 NOTE — PROGRESS NOTES
Name: Shiva Recinos      : 1985      MRN: [id number]  Encounter Provider: ISAURO Kohli  Encounter Date: 2025   Encounter department: [facility]  :    Assessment & Plan  Migraine without aura and without status migrainosus, not intractable  He has a history of migraine headaches and headaches associated with sexual activity for about 16 years now.  They used to be infrequent.  Since his birthday (about 1 month ago) he started with headaches associated with sexual activity on a regular basis and his migraines are also occurring at a more frequent rate.  He is unsure of the exact number of each headache type that he has been experiencing, but does not having about 15 headache days per month combined.  He has tried ibuprofen which sometimes takes the edge off but has not provided him with complete relief.  He does have sleep apnea but is compliant with his cpap machine. He is unable to exercise currently due to a work injury for which he is currently on workman's comp for at the moment.  He is ambulating with crutches.  Does note chronic back pain, neck pain, and currently pain to his right leg and hip/groin.  He discussed these headaches with his PCP who ordered a brain MRI which came back without abnormality.  Denies any blind spots, tunnel vision, or double vision with either headache type.  He also denies aura.  I would like for him to keep a log of his headaches and migraines so we can determine any additional triggers and so we know how many of each headache type he is experiencing.    Workup:  25 MRI Brain: normal  Additional testing:   With no new or concerning symptoms, no red flags and an unremarkable neurologic exam, there is no specific indication for further evaluation with MRI brain.  However, this could be obtained at any time if indicated  Preventative:  We discussed headache hygiene and lifestyle factors that may improve headaches  Start  propranolol 40mg BID  May start magnesium and B2 400mg each daily  Currently on through other providers: celexa, gabapentin  Past/ failed/contraindicated: none  Future options: Propranolol, amitriptyline, Topamax, CGRP med, botox  Acute:  Discussed not taking over-the-counter or prescription pain medications more than 3 days per week to prevent medication overuse/rebound headache  Start Zomig nasal spray. One spray into each nostril at the onset of a headache. May repeat again in 2 hours if not completely headache free. No more than 2 doses in 24 hours   Currently on through other providers: tizanidine  Past/ failed/contraindicated: ibuprofen, tylenol (ineffective)  Future options:  Other triptan (Maxalt), ubrelvy, reyvow, nurtec    Orders:    ZOLMitriptan (ZOMIG) 5 MG nasal solution; One spray into each nostril at the onset of a headache. May repeat again in 2 hours if not completely headache free. No more than 2 doses in 24 hours    propranolol (INDERAL) 40 mg tablet; Take 1 tablet (40 mg total) by mouth 2 (two) times a day    Headache associated with sexual activity  He experiences both pre-orgasmic headaches which build up slowly over the course of sexual activity along with more severe headaches that occur at the time of orgasm.  He denies any nausea or vomiting with these headaches. Denies vision changes or vision loss.  Both headaches can last into the next day although the severity does decrease over time.  He completed a brain MRI which came back without abnormality.  Since this has been occurring intermittently for this patient over the course of the past 15 or 16 years and his neuro exam in the office is non focal, I do not think any additional testing needs to be completed at this juncture.  However, can consider additional workup if symptoms persist or do not respond to treatment. Can consider CTA head and neck and/or LP.  We discussed preventative options, treating the headaches about 30-60 minutes  prior to sexual activity with propranolol or indomethacin.  Since he does experience migraine headaches outside of sexual activity, however, we will try propranolol as a daily preventative and see if this is also effective for his sexual activity headaches.  If not, may need to consider an increase dose of propranolol prior to sexual activity or indomethacin.  I would like for him to keep a log of his headaches.   Acute:  Discussed not taking over-the-counter or prescription pain medications more than 3 days per week to prevent medication overuse/rebound headache  Start Zomig nasal spray which is approved and recommended for headaches associated with sexual activity. One spray into each nostril at the onset of a headache. May repeat again in 2 hours if not completely headache free. No more than 2 doses in 24 hours   Currently on through other providers: none  Past/ failed/contraindicated: ibuprofen, tylenol (ineffective)  Future options:  subcutaneous imitrex, Maxalt ODT, ubrelvy, reyvow, nurtec    Orders:    ZOLMitriptan (ZOMIG) 5 MG nasal solution; One spray into each nostril at the onset of a headache. May repeat again in 2 hours if not completely headache free. No more than 2 doses in 24 hours    propranolol (INDERAL) 40 mg tablet; Take 1 tablet (40 mg total) by mouth 2 (two) times a day          History of Present Illness     We had the pleasure of evaluating Shiva in neurological consultation today. He is a 40 y.o. year-old male who presents today for evaluation of headaches.     He has a history of migraine headaches and headaches associated with sexual activity for about 16 years now.  They used to be infrequent.  Since his birthday (about 1 month ago) he started with headaches associated with sexual activity on a regular basis and his migraines are also occurring at a more frequent rate.  He is unsure of the exact number of each headache type that he has been experiencing, but does not having about 15  "headache days per month combined.  He has tried ibuprofen which sometimes takes the edge off but has not provided him with complete relief.  He does have sleep apnea but is compliant with his cpap machine. He is unable to exercise currently due to a work injury for which he is currently on workman's comp for at the moment.  He is ambulating with crutches.  Does note chronic back pain, neck pain, and currently pain to his right leg and hip/groin.  He discussed these headaches with his PCP who ordered a brain MRI which came back without abnormality.  Denies any blind spots, tunnel vision, or double vision with either headache type.  He also denies aura.      Headaches started at what age? 40 years old  How often do the headaches occur? 15/30 days   What time of the day do the headaches start?  No particular time of day for migraines  How long do the headaches last? Migraines can last a few hours. Sexual headaches can linger into the next day.   Are you ever headache free? Yes    Aura? without aura     Last eye exam: within the past year    Where is your headache located and pain quality? Pulsing pain bilateral top of head, behind the eyes and occiput.  \"Feels like head is going to blow up\" with sex headaches  What is the intensity of pain? Average: 5-6/10, worst 10/10    Associated symptoms:   [] Nausea       [] Vomiting        [] Diarrhea  [] Insomnia    [x] Stiff or sore neck   [x] Problems with concentration  [x] Photophobia     [x]Phonophobia      [] Osmophobia  [] Blurred vision   [x] Prefer quiet, dark room  [x] Light-headed or dizzy     [] Tinnitus   [] Hands or feet tingle or feel numb/paresthesias    [] Ptosis      [] Facial droop  [x] Lacrimation bilaterally when severe  [] Nasal congestion/rhinorrhea   [] Flushing of face    Things that make the headache worse? any movement     Headache triggers: Sexual activity. No triggers for migraines that he is aware of    Have you seen someone else for headaches or " pain? No  Have you had trigger point injection performed and how often? No  Have you had Botox injection performed and how often? No   Have you had epidural injections or transforaminal injections performed? No  Have you ever had any Brain imaging? Yes MRI     LIFESTYLE  Sleep   Averages: 7 hours but wakes up frequently   Problems falling asleep?: No  Problems staying asleep?: Yes  Do you snore while asleep?Yes  Do you wake up with headaches? No  Ever evaluated for sleep apnea? Yes. Compliant with CPAP  Physical activity: none recently- he was hurt at work. Chronic right hip pain, back pain, groin pain  Water: <48 oz per day  Caffeine: occasional coffee   Mood: reports having some depression and anxiety on celexa.  Denies SI and HI.     Pertinent family history:  Family history of headaches: sister has migraines  Any family history of aneurysms - No    Pertinent social history:  Work: currently out of work due to injury- on worker's comp  Lives with lives with their family  Illicit Drugs: THC gummies for pain  Alcohol/tobacco: social drinking rarely no tobacco     What medications do you take or have you taken for your headaches?:    ABORTIVE:    OTC medications: Ibuprofen (might take the edge off)  Prescription: none  Medications from other providers: Tizanidine  Past/failed/contraindicated: none    PREVENTIVE:   OTC medications: none  Prescription: none  Medications from other providers: Celexa, gabapentin  Past/failed/contraindicated: none    Alternative therapies used in the past for headaches?   Rest in a dark room    Review of Systems   Constitutional:  Negative for appetite change, fatigue and fever.   HENT: Negative.  Negative for hearing loss, tinnitus, trouble swallowing and voice change.    Eyes:  Positive for photophobia. Negative for pain and visual disturbance.        Patient states having sensitivity to light.    Respiratory: Negative.  Negative for shortness of breath.    Cardiovascular: Negative.   Negative for palpitations.   Gastrointestinal: Negative.  Negative for nausea and vomiting.   Endocrine: Negative.  Negative for cold intolerance.   Genitourinary: Negative.  Negative for dysuria, frequency and urgency.   Musculoskeletal:  Positive for neck stiffness. Negative for back pain, gait problem, myalgias and neck pain.        Patient states he gets neck neck stiffness.    Skin: Negative.  Negative for rash.   Allergic/Immunologic: Negative.    Neurological:  Positive for headaches. Negative for dizziness, tremors, seizures, syncope, facial asymmetry, speech difficulty, weakness, light-headedness and numbness.        Patient states an increase in HA. Patient states HA were more during sex or after climax. Patient states he is having a twitching sensations on both side of the head.    Hematological: Negative.  Does not bruise/bleed easily.   Psychiatric/Behavioral: Negative.  Negative for confusion, hallucinations and sleep disturbance.      I have personally reviewed the MA's review of systems and made changes as necessary.    Current Outpatient Medications on File Prior to Visit   Medication Sig Dispense Refill    Blood Glucose Monitoring Suppl (OneTouch Verio Reflect) w/Device KIT 2 (two) times a day      citalopram (CeleXA) 40 mg tablet Take 40 mg by mouth daily      ezetimibe (ZETIA) 10 mg tablet Take 10 mg by mouth daily      fenofibrate 160 MG tablet Take 1 tablet by mouth daily      fish oil-omega-3 fatty acids 1000 MG capsule Take 2 g by mouth daily      gabapentin (NEURONTIN) 300 mg capsule Take 300-600 mg by mouth daily at bedtime      ibuprofen (MOTRIN) 600 mg tablet Take 1 tablet (600 mg total) by mouth every 6 (six) hours as needed for moderate pain or mild pain 30 tablet 0    Lancets (OneTouch Delica Plus [id number]) MISC 2 (two) times a day As directed      omega-3-acid ethyl esters (LOVAZA) 1 g capsule Take 2 g by mouth 2 (two) times a day      OneTouch Verio test strip 2 (two) times a day As  directed      pantoprazole (PROTONIX) 40 mg tablet Take 40 mg by mouth 2 (two) times a day      sildenafil (VIAGRA) 100 mg tablet Take 100 mg by mouth daily as needed for erectile dysfunction      simvastatin (ZOCOR) 20 mg tablet Take 20 mg by mouth daily at bedtime      tadalafil (CIALIS) 5 MG tablet Take 5 mg by mouth daily as needed      tiZANidine (ZANAFLEX) 4 mg tablet Take 4 mg by mouth every 8 (eight) hours as needed for muscle spasms      metFORMIN (GLUCOPHAGE) 500 mg tablet Take 1 tablet (500 mg total) by mouth 2 (two) times a day with meals for 14 days Do not start before June 14, 2023. 28 tablet 0    [DISCONTINUED] Alum & Mag Hydroxide-Simeth (MAALOX ADVANCED PO) Take 5 mL by mouth every 6 (six) hours as needed (Patient not taking: Reported on 6/4/2024)      [DISCONTINUED] bisacodyl (DULCOLAX) 5 mg EC tablet bisacodyl 5 mg tablet,delayed release   PLEASE SEE ATTACHED FOR DETAILED DIRECTIONS (Patient not taking: Reported on 6/4/2024)      [DISCONTINUED] clotrimazole-betamethasone (LOTRISONE) 1-0.05 % cream Apply topically 2 (two) times a day for 28 days Apply thin layer to affected area twice daily. (Patient not taking: Reported on 10/30/2024) 15 g 0    [DISCONTINUED] diazepam (VALIUM) 5 mg tablet 1 TABLET AS NEEDED ONCE A DAY ORALLY 22 DAYS (Patient not taking: Reported on 3/24/2024)      [DISCONTINUED] famotidine (PEPCID) 40 MG tablet Take 1 tablet by mouth daily as needed (Patient not taking: Reported on 10/30/2024)      [DISCONTINUED] omeprazole (PriLOSEC) 40 MG capsule omeprazole 40 mg capsule,delayed release   TAKE 1 CAPSULE (40 MG TOTAL) BY MOUTH DAILY. (Patient not taking: Reported on 10/30/2024)      [DISCONTINUED] Ozempic, 2 MG/DOSE, 8 MG/3ML injection pen INJECT 2 MG SUBCUTANEOUSLY ONE TIME PER WEEK (Patient not taking: Reported on 10/30/2024)      [DISCONTINUED] polyethylene glycol (GLYCOLAX) 17 GM/SCOOP powder polyethylene glycol 3350 17 gram/dose oral powder   PLEASE SEE ATTACHED FOR DETAILED  DIRECTIONS (Patient not taking: Reported on 2024)      [DISCONTINUED] Wegovy 2.4 MG/0.75ML Inject 2.4 mg under the skin once a week (Patient not taking: Reported on 2025)       No current facility-administered medications on file prior to visit.      Social History     Tobacco Use    Smoking status: Former     Current packs/day: 0.00     Average packs/day: 1 pack/day for 15.0 years (15.0 ttl pk-yrs)     Types: Cigarettes     Start date:      Quit date:      Years since quittin.3    Smokeless tobacco: Never   Vaping Use    Vaping status: Never Used   Substance and Sexual Activity    Alcohol use: Not Currently     Comment: socially    Drug use: No    Sexual activity: Yes     Partners: Female      Objective   /78 (BP Location: Right arm, Patient Position: Sitting, Cuff Size: Extra-Large)     Physical Exam  Vitals reviewed.   Constitutional:       General: He is not in acute distress.  HENT:      Head: Normocephalic and atraumatic.      Nose: Nose normal.      Mouth/Throat:      Mouth: Mucous membranes are moist.   Eyes:      General: Lids are normal.      Extraocular Movements: Extraocular movements intact.      Pupils: Pupils are equal, round, and reactive to light.   Pulmonary:      Effort: No respiratory distress.   Skin:     General: Skin is warm and dry.   Neurological:      Coordination: Romberg sign positive.   Psychiatric:         Mood and Affect: Affect normal.         Speech: Speech normal.       Neurological Exam  Mental Status  Awake, alert and oriented to person, place and time. Speech is normal. Language is fluent with no aphasia. Attention and concentration are normal.    Cranial Nerves  CN II: Visual acuity is normal. Visual fields full to confrontation.  CN III, IV, VI: Extraocular movements intact bilaterally. Normal lids and orbits bilaterally. Pupils equal round and reactive to light bilaterally.  CN V: Facial sensation is normal.  CN VII: Full and symmetric facial  movement.  CN VIII: Hearing is normal.  CN IX, X: Palate elevates symmetrically  CN XI: Shoulder shrug strength is normal.  CN XII: Tongue midline without atrophy or fasciculations.    Motor  Normal muscle bulk throughout.                                               Right                     Left  Deltoid                                   5                          5   Biceps                                   5                          5   Triceps                                  5                          5   Iliopsoas                                                        5   Quadriceps                                                    5   Hamstring                                                      5  Ankle dorsiflexor                                            5  RLE weakness secondary to pain..    Sensory  Light touch is normal in upper and lower extremities. Decreased to RLE since injury.     Coordination  Right: Finger-to-nose normal. Rapid alternating movement normal.Left: Finger-to-nose normal. Rapid alternating movement normal.    Gait  Casual gait: Ambulating with crutches. Antalgic gait. Romberg is present.    Labs:  Lab Results   Component Value Date    HGBA1C 5.4 02/26/2025     Lab Results   Component Value Date    TSH 1.01 02/26/2025     Lab Results   Component Value Date    SODIUM 140 02/26/2025    K 4.1 02/26/2025     02/26/2025    CO2 27 02/26/2025    AGAP 9 02/26/2025    BUN 14 02/26/2025    CREATININE 0.87 02/26/2025    GLUC 117 (H) 02/26/2025    CALCIUM 9.5 02/26/2025    AST 15 02/26/2025    ALT 30 02/26/2025    ALKPHOS 101 02/26/2025    TP 7 02/26/2025    TBILI 0.4 02/26/2025    EGFR 111 02/26/2025     Radiology Results Review:   4/11/25 MRI Brain: Unremarkable MR of the brain. No pathologic intracranial signal visualized.     Administrative Statements   I have spent a total time of 45 minutes in caring for this patient on the day of the visit/encounter including Diagnostic results,  Prognosis, Risks and benefits of tx options, Instructions for management, Patient and family education, Importance of tx compliance, Risk factor reductions, Impressions, Counseling / Coordination of care, Documenting in the medical record, Reviewing/placing orders in the medical record (including tests, medications, and/or procedures), and Obtaining or reviewing history  .

## 2025-04-24 ENCOUNTER — OFFICE VISIT (OUTPATIENT)
Dept: NEUROLOGY | Facility: CLINIC | Age: 40
End: 2025-04-24
Payer: COMMERCIAL

## 2025-04-24 VITALS — DIASTOLIC BLOOD PRESSURE: 78 MMHG | SYSTOLIC BLOOD PRESSURE: 110 MMHG

## 2025-04-24 DIAGNOSIS — G44.82 HEADACHE ASSOCIATED WITH SEXUAL ACTIVITY: ICD-10-CM

## 2025-04-24 DIAGNOSIS — G43.009 MIGRAINE WITHOUT AURA AND WITHOUT STATUS MIGRAINOSUS, NOT INTRACTABLE: Primary | ICD-10-CM

## 2025-04-24 PROCEDURE — 99204 OFFICE O/P NEW MOD 45 MIN: CPT

## 2025-04-24 RX ORDER — ZOLMITRIPTAN 5 MG/1
SPRAY NASAL
Qty: 6 EACH | Refills: 0 | Status: SHIPPED | OUTPATIENT
Start: 2025-04-24

## 2025-04-24 RX ORDER — PROPRANOLOL HYDROCHLORIDE 40 MG/1
40 TABLET ORAL 2 TIMES DAILY
Qty: 60 TABLET | Refills: 2 | Status: SHIPPED | OUTPATIENT
Start: 2025-04-24

## 2025-04-24 NOTE — PROGRESS NOTES
Name: Shiva Recinos      : 1985      MRN: [id number]  Encounter Provider: ISAURO Kohli  Encounter Date: 2025   Encounter department: [facility]  :  Assessment & Plan      {Ambulatory Patient Instructions (Optional):98384}    History of Present Illness {?Quick Links Encounters * My Last Note * Last Note in Specialty * Snapshot * Since Last Visit * History :47802}  HPI   Review of Systems   Constitutional:  Negative for appetite change, fatigue and fever.   HENT: Negative.  Negative for hearing loss, tinnitus, trouble swallowing and voice change.    Eyes:  Positive for photophobia. Negative for pain and visual disturbance.        Patient states having sensitivity to light.    Respiratory: Negative.  Negative for shortness of breath.    Cardiovascular: Negative.  Negative for palpitations.   Gastrointestinal: Negative.  Negative for nausea and vomiting.   Endocrine: Negative.  Negative for cold intolerance.   Genitourinary: Negative.  Negative for dysuria, frequency and urgency.   Musculoskeletal:  Positive for neck stiffness. Negative for back pain, gait problem, myalgias and neck pain.        Patient states he gets neck neck stiffness.    Skin: Negative.  Negative for rash.   Allergic/Immunologic: Negative.    Neurological:  Positive for headaches. Negative for dizziness, tremors, seizures, syncope, facial asymmetry, speech difficulty, weakness, light-headedness and numbness.        Patient states an increase in HA. Patient states HA were more during sex or after climax. Patient states he is having a twitching sensations on both side of the head.    Hematological: Negative.  Does not bruise/bleed easily.   Psychiatric/Behavioral: Negative.  Negative for confusion, hallucinations and sleep disturbance.     I have personally reviewed the MA's review of systems and made changes as necessary.    {Select to insert medical history sections (Optional):91532}      Objective {?Quick Links Trend Vitals * Enter New Vitals * Results Review * Timeline (Adult) * Labs * Imaging * Cardiology * Procedures * Lung Cancer Screening * Surgical eConsent :47349}  There were no vitals taken for this visit.    Physical Exam  Neurological Exam    {Radiology Results Review (Optional):53715}    {Administrative / Billing Section (Optional):26110}

## 2025-04-25 ENCOUNTER — TELEPHONE (OUTPATIENT)
Dept: NEUROLOGY | Facility: CLINIC | Age: 40
End: 2025-04-25

## 2025-05-01 NOTE — TELEPHONE ENCOUNTER
Denial for zolmitriptan nasal spray scanned to media, encounter dated 4/29.    must try and fail two preferred triptans: eletriptan, naratriptan, rizatriptan, sumatriptan and zolmitriptan tablet    Please provide recommendation, thank you.

## 2025-05-05 DIAGNOSIS — G44.82 HEADACHE ASSOCIATED WITH SEXUAL ACTIVITY: Primary | ICD-10-CM

## 2025-05-05 DIAGNOSIS — G43.009 MIGRAINE WITHOUT AURA AND WITHOUT STATUS MIGRAINOSUS, NOT INTRACTABLE: ICD-10-CM

## 2025-05-05 RX ORDER — RIZATRIPTAN BENZOATE 10 MG/1
10 TABLET, ORALLY DISINTEGRATING ORAL AS NEEDED
Qty: 12 TABLET | Refills: 2 | Status: SHIPPED | OUTPATIENT
Start: 2025-05-05

## 2025-05-06 ENCOUNTER — OFFICE VISIT (OUTPATIENT)
Age: 40
End: 2025-05-06
Payer: OTHER MISCELLANEOUS

## 2025-05-06 VITALS
WEIGHT: 256 LBS | SYSTOLIC BLOOD PRESSURE: 116 MMHG | OXYGEN SATURATION: 97 % | HEIGHT: 68 IN | HEART RATE: 70 BPM | BODY MASS INDEX: 38.8 KG/M2 | DIASTOLIC BLOOD PRESSURE: 82 MMHG | TEMPERATURE: 96.9 F

## 2025-05-06 DIAGNOSIS — R10.31 CHRONIC PAIN OF RIGHT GROIN: Primary | ICD-10-CM

## 2025-05-06 DIAGNOSIS — G89.29 CHRONIC PAIN OF RIGHT GROIN: Primary | ICD-10-CM

## 2025-05-06 PROCEDURE — 99213 OFFICE O/P EST LOW 20 MIN: CPT | Performed by: SURGERY

## 2025-05-06 NOTE — PROGRESS NOTES
"Shiva Recinos is a 39 y.o. 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  The right groin was explored and a cord lipoma was resected with mesh repair of the inguinal canal floor.  At that time, he had a smaller cord lipoma on the left.  He recently mentioned left groin discomfort during an ED visit 27 November 2024 .  An ultrasound was performed on 27 November 2024:  IMPRESSION:  Normal.     He returned to the clinic now stating that the pain that he had in his right groin and hip area before the right inguinal hernia repair surgery 5 years ago is the same pain that he has now and this has been continuous since \"my Worker's Comp injury\".  \"I just want the pain to go away\".    I saw him on 3 March 2025 and explained to him that his chronic groin pain was not from his right cord lipoma which I removed 5 years ago.  This is supported by the report that his pain continued postoperatively unchanged from his preoperative complaints.  He wanted \"another workup\", so I ordered a right groin ultrasound.  He follows up now to review the ultrasound results.  The ultrasound images and report are not available for my review in epic.    Patient presented with CD disc for a CT pelvis performed 9 April 2025.  This cannot be viewed in the clinic.  His right groin ultrasound 9 April 2025 report (scanned into epic):  Findings:  \"There is a hypoechoic structure 2.9 x 2.2 x 1.3 cm in the right groin inferior-medial to known mesh herniorrhaphy.  There appears to be a 0.7 cm defect in the visible muscle wall.  There is no intrinsic bowel motion.  Impression:  Suspected right inguinal fatty hernia.    On exam, he has no obvious recurrent hernia.  I explained, at length, that the ultrasound report likely represents a small lipoma in the cord which is not unusual.  Resecting a larger lipoma 5 " "years ago did not change his pain complaints.  The Radha mesh repair is intact with no obvious hernia on exam. I am uncertain what the \"0.7 cm defect\" is on ultrasound, but this likely represents the deep inguinal ring.    I do not believe he has any surgically correctable cause to his chronic right groin pain.  Patient desires a second opinion.  He lives in [place] and has had previous appointments with Dr. Bloch.  I will place a referral to him for a second opinion.    In the meantime, I asked the patient to obtain a disc with the ultrasound images.  He will deliver both the CD with ultrasound images as well as the CD with his pelvic CT scan to the radiology department at [facility] to be uploaded into epic prior to his appointment.        "

## 2025-05-17 DIAGNOSIS — G43.009 MIGRAINE WITHOUT AURA AND WITHOUT STATUS MIGRAINOSUS, NOT INTRACTABLE: ICD-10-CM

## 2025-05-17 DIAGNOSIS — G44.82 HEADACHE ASSOCIATED WITH SEXUAL ACTIVITY: ICD-10-CM

## 2025-05-19 RX ORDER — PROPRANOLOL HYDROCHLORIDE 40 MG/1
40 TABLET ORAL 2 TIMES DAILY
Qty: 180 TABLET | Refills: 1 | OUTPATIENT
Start: 2025-05-19

## 2025-05-22 ENCOUNTER — TELEPHONE (OUTPATIENT)
Age: 40
End: 2025-05-22

## 2025-06-03 ENCOUNTER — PATIENT MESSAGE (OUTPATIENT)
Dept: NEUROLOGY | Facility: CLINIC | Age: 40
End: 2025-06-03

## 2025-06-03 DIAGNOSIS — G44.82 HEADACHE ASSOCIATED WITH SEXUAL ACTIVITY: ICD-10-CM

## 2025-06-03 DIAGNOSIS — G43.009 MIGRAINE WITHOUT AURA AND WITHOUT STATUS MIGRAINOSUS, NOT INTRACTABLE: Primary | ICD-10-CM

## 2025-06-30 RX ORDER — SUMATRIPTAN 20 MG/1
SPRAY NASAL
Qty: 6 EACH | Refills: 0 | Status: SHIPPED | OUTPATIENT
Start: 2025-06-30

## 2025-06-30 NOTE — PATIENT COMMUNICATION
"ISAURO Kohli to Me  [facility] [place] (Selected Message)        6/30/25 12:35 PM  Would you be able to see if nasal sumatriptan is covered by patient's plan? I will put the order in now.  If so, please contact patient to inform him. I appreciate your help.   ____________________________________________    Called [facility] to follow up. Spoke with the pharmacist. She stated that it is unable to be run through pt's insurance because it is coming up as \"duplication of therapy\". Pt recently picked up rizatriptan.    [facility] and spoke with Shawna. Made her aware that rizatriptan was discontinued. She was able to perform an override so the sumatriptan nasal spray can be filled. Shawna ran a test claim and received a paid claim.    Called [facility] and spoke with the pharmacist. She was able to obtain a paid claim and said the medication would be ready for  within an hour. Sent pt a message through [employer].  "

## 2025-07-25 ENCOUNTER — TELEPHONE (OUTPATIENT)
Dept: NEUROLOGY | Facility: CLINIC | Age: 40
End: 2025-07-25

## 2025-08-03 DIAGNOSIS — G43.009 MIGRAINE WITHOUT AURA AND WITHOUT STATUS MIGRAINOSUS, NOT INTRACTABLE: ICD-10-CM

## 2025-08-03 DIAGNOSIS — G44.82 HEADACHE ASSOCIATED WITH SEXUAL ACTIVITY: ICD-10-CM

## 2025-08-05 RX ORDER — PROPRANOLOL HYDROCHLORIDE 40 MG/1
40 TABLET ORAL 2 TIMES DAILY
Qty: 60 TABLET | Refills: 1 | Status: SHIPPED | OUTPATIENT
Start: 2025-08-05

## (undated) DEVICE — SPONGE GAUZE 4 X 8 12 PLY STRL LF

## (undated) DEVICE — GLOVE INDICATOR PI UNDERGLOVE SZ 7.5 BLUE

## (undated) DEVICE — SUT VICRYL 2-0 SH 27 IN UNDYED J417H

## (undated) DEVICE — DRAPE SHEET THREE QUARTER

## (undated) DEVICE — SYRINGE 10ML LL CONTROL TOP

## (undated) DEVICE — SYRINGE 5ML LL

## (undated) DEVICE — GLOVE SRG BIOGEL 6.5

## (undated) DEVICE — SUT MONOCRYL 4-0 PC-5 18 IN Y823G

## (undated) DEVICE — TOWEL SET X-RAY

## (undated) DEVICE — GLOVE INDICATOR PI UNDERGLOVE SZ 6.5 BLUE

## (undated) DEVICE — BASIC DOUBLE BASIN 2-LF: Brand: MEDLINE INDUSTRIES, INC.

## (undated) DEVICE — ANTIBACTERIAL UNDYED BRAIDED (POLYGLACTIN 910), SYNTHETIC ABSORBABLE SUTURE: Brand: COATED VICRYL

## (undated) DEVICE — SMALL NEEDLE COUNTER NEST

## (undated) DEVICE — NON-STERILE REUSABLE TOURNIQUET CUFF SINGLE BLADDER, DUAL PORT AND QUICK CONNECT CONNECTOR: Brand: COLOR CUFF

## (undated) DEVICE — 3000CC GUARDIAN II: Brand: GUARDIAN

## (undated) DEVICE — CAST PLASTER 4 IN ROLL

## (undated) DEVICE — CHLORAPREP HI-LITE 26ML ORANGE

## (undated) DEVICE — SUT VICRYL 3-0 18 IN J904T

## (undated) DEVICE — GLOVE SRG BIOGEL 8

## (undated) DEVICE — SPONGE: SPECIALTY K-DISS XR  100/CS: Brand: MEDICAL ACTION INDUSTRIES

## (undated) DEVICE — SPONGE LAP 18 X 18 IN

## (undated) DEVICE — INTENDED FOR TISSUE SEPARATION, AND OTHER PROCEDURES THAT REQUIRE A SHARP SURGICAL BLADE TO PUNCTURE OR CUT.: Brand: BARD-PARKER SAFETY BLADES SIZE 15, STERILE

## (undated) DEVICE — GLOVE SRG BIOGEL 7.5

## (undated) DEVICE — PACK GENERAL LF

## (undated) DEVICE — RADIOLOGY STERILE LABELS: Brand: CENTURION

## (undated) DEVICE — NEEDLE BLUNT 18 G X 1 1/2 W FILTER

## (undated) DEVICE — TRAY EPIDURAL PERIFIX 20GA X 3.5IN TUOHY 8ML

## (undated) DEVICE — WIPES BABY PAMPERS SENSITIVE 36/PK

## (undated) DEVICE — ADHESIVE SKN CLSR HISTOACRYL FLEX 0.5ML LF

## (undated) DEVICE — SCD SEQUENTIAL COMPRESSION COMFORT SLEEVE MEDIUM KNEE LENGTH: Brand: KENDALL SCD

## (undated) DEVICE — SUT SILK 2-0 SH 30 IN K833H

## (undated) DEVICE — SUT VICRYL 3-0 SH 27 IN J416H

## (undated) DEVICE — FLEXIBLE ADHESIVE BANDAGE,X-LARGE: Brand: CURITY

## (undated) DEVICE — STRL PENROSE DRAIN 18" X 1/4": Brand: CARDINAL HEALTH

## (undated) DEVICE — SUT PROLENE 2-0 RB-1/RB-1 36 IN 8559H

## (undated) DEVICE — Device

## (undated) DEVICE — ASTOUND STANDARD SURGICAL GOWN, XL: Brand: CONVERTORS

## (undated) DEVICE — OCCLUSIVE GAUZE STRIP,3% BISMUTH TRIBROMOPHENATE IN PETROLATUM BLEND: Brand: XEROFORM

## (undated) DEVICE — CAST PADDING 4 IN UNSTERILE

## (undated) DEVICE — BANDAGE, ESMARK LF STR 6"X9' (20/CS): Brand: CYPRESS

## (undated) DEVICE — NEEDLE SPINAL 22G X 5IN QUINCKE

## (undated) DEVICE — ADHESIVE SKIN HIGH VISCOSITY EXOFIN 1ML

## (undated) DEVICE — PLASTIC ADHESIVE BANDAGE: Brand: CURITY

## (undated) DEVICE — DRAPE UTILITY

## (undated) DEVICE — INTENDED FOR TISSUE SEPARATION, AND OTHER PROCEDURES THAT REQUIRE A SHARP SURGICAL BLADE TO PUNCTURE OR CUT.: Brand: BARD-PARKER ® CARBON RIB-BACK BLADES

## (undated) DEVICE — SKIN MARKER DUAL TIP WITH RULER CAP, FLEXIBLE RULER AND LABELS: Brand: DEVON

## (undated) DEVICE — NEEDLE SPINAL 22G X 3.5IN  QUINCKE

## (undated) DEVICE — TIBURON LAPAROTOMY DRAPE: Brand: CONVERTORS

## (undated) DEVICE — CHLORAPREP APPLICATOR TINTED 10.5ML ONE-STEP

## (undated) DEVICE — NEEDLE 25G X 1 1/2

## (undated) DEVICE — TRAY EPID CONT PERIFIX 18G X 3.5IN 5ML CLSD TIP DRAPE

## (undated) DEVICE — KIT MINI SUTURETAK DISP